# Patient Record
Sex: FEMALE | Race: BLACK OR AFRICAN AMERICAN | NOT HISPANIC OR LATINO
[De-identification: names, ages, dates, MRNs, and addresses within clinical notes are randomized per-mention and may not be internally consistent; named-entity substitution may affect disease eponyms.]

---

## 2017-01-04 ENCOUNTER — APPOINTMENT (OUTPATIENT)
Dept: ORTHOPEDIC SURGERY | Facility: CLINIC | Age: 77
End: 2017-01-04

## 2017-01-04 VITALS
HEART RATE: 53 BPM | BODY MASS INDEX: 25.03 KG/M2 | WEIGHT: 136 LBS | HEIGHT: 62 IN | DIASTOLIC BLOOD PRESSURE: 77 MMHG | SYSTOLIC BLOOD PRESSURE: 183 MMHG

## 2017-01-04 RX ADMIN — LIDOCAINE HYDROCHLORIDE 3 %: 10 INJECTION, SOLUTION EPIDURAL; INFILTRATION; INTRACAUDAL; PERINEURAL at 00:00

## 2017-01-04 RX ADMIN — Medication 2 MG/2ML: at 00:00

## 2017-01-09 RX ORDER — HYALURONATE SODIUM 20 MG/2 ML
20 SYRINGE (ML) INTRAARTICULAR
Qty: 0 | Refills: 0 | Status: COMPLETED | OUTPATIENT
Start: 2017-01-04

## 2017-01-09 RX ORDER — HYALURONATE SODIUM 20 MG/2 ML
20 SYRINGE (ML) INTRAARTICULAR
Refills: 0 | Status: COMPLETED | OUTPATIENT
Start: 2017-01-04

## 2017-01-09 RX ORDER — LIDOCAINE HYDROCHLORIDE 10 MG/ML
1 INJECTION, SOLUTION INFILTRATION; PERINEURAL
Refills: 0 | Status: COMPLETED | OUTPATIENT
Start: 2017-01-04

## 2017-01-11 ENCOUNTER — APPOINTMENT (OUTPATIENT)
Dept: ORTHOPEDIC SURGERY | Facility: CLINIC | Age: 77
End: 2017-01-11

## 2017-01-11 VITALS
WEIGHT: 136 LBS | SYSTOLIC BLOOD PRESSURE: 151 MMHG | HEART RATE: 67 BPM | HEIGHT: 62 IN | BODY MASS INDEX: 25.03 KG/M2 | DIASTOLIC BLOOD PRESSURE: 81 MMHG

## 2019-08-14 ENCOUNTER — APPOINTMENT (OUTPATIENT)
Dept: ORTHOPEDIC SURGERY | Facility: CLINIC | Age: 79
End: 2019-08-14
Payer: MEDICARE

## 2019-08-14 VITALS
BODY MASS INDEX: 25.03 KG/M2 | HEIGHT: 62 IN | HEART RATE: 64 BPM | DIASTOLIC BLOOD PRESSURE: 81 MMHG | WEIGHT: 136 LBS | SYSTOLIC BLOOD PRESSURE: 135 MMHG

## 2019-08-14 DIAGNOSIS — Z86.73 PERSONAL HISTORY OF TRANSIENT ISCHEMIC ATTACK (TIA), AND CEREBRAL INFARCTION W/OUT RESIDUAL DEFICITS: ICD-10-CM

## 2019-08-14 DIAGNOSIS — Z87.39 PERSONAL HISTORY OF OTHER DISEASES OF THE MUSCULOSKELETAL SYSTEM AND CONNECTIVE TISSUE: ICD-10-CM

## 2019-08-14 PROCEDURE — 73562 X-RAY EXAM OF KNEE 3: CPT | Mod: 50

## 2019-08-14 PROCEDURE — 99214 OFFICE O/P EST MOD 30 MIN: CPT

## 2019-08-15 PROBLEM — Z87.39 HISTORY OF OSTEOPOROSIS: Status: RESOLVED | Noted: 2019-08-14 | Resolved: 2019-08-15

## 2019-08-15 PROBLEM — Z86.73 HISTORY OF STROKE: Status: RESOLVED | Noted: 2019-08-14 | Resolved: 2019-08-15

## 2019-08-26 ENCOUNTER — CHART COPY (OUTPATIENT)
Age: 79
End: 2019-08-26

## 2019-08-26 RX ORDER — HYALURONATE SODIUM 20 MG/2 ML
20 SYRINGE (ML) INTRAARTICULAR
Qty: 2 | Refills: 0 | Status: DISCONTINUED | OUTPATIENT
Start: 2019-08-14 | End: 2019-08-26

## 2019-10-02 RX ORDER — HYLAN G-F 20 16MG/2ML
48 SYRINGE (ML) INTRAARTICULAR
Qty: 2 | Refills: 0 | Status: ACTIVE | OUTPATIENT
Start: 2019-08-26

## 2019-10-07 ENCOUNTER — APPOINTMENT (OUTPATIENT)
Dept: OBGYN | Facility: CLINIC | Age: 79
End: 2019-10-07
Payer: MEDICARE

## 2019-10-07 PROCEDURE — 99387 INIT PM E/M NEW PAT 65+ YRS: CPT

## 2019-10-28 ENCOUNTER — APPOINTMENT (OUTPATIENT)
Dept: ORTHOPEDIC SURGERY | Facility: CLINIC | Age: 79
End: 2019-10-28
Payer: MEDICARE

## 2019-10-28 VITALS — BODY MASS INDEX: 25.03 KG/M2 | WEIGHT: 136 LBS | HEIGHT: 62 IN

## 2019-10-28 DIAGNOSIS — M17.11 UNILATERAL PRIMARY OSTEOARTHRITIS, RIGHT KNEE: ICD-10-CM

## 2019-10-28 PROCEDURE — 20610 DRAIN/INJ JOINT/BURSA W/O US: CPT | Mod: LT

## 2019-11-07 RX ORDER — HYLAN G-F 20 16MG/2ML
48 SYRINGE (ML) INTRAARTICULAR
Refills: 0 | Status: COMPLETED | OUTPATIENT
Start: 2019-11-07

## 2019-11-22 ENCOUNTER — APPOINTMENT (OUTPATIENT)
Dept: ORTHOPEDIC SURGERY | Facility: CLINIC | Age: 79
End: 2019-11-22
Payer: MEDICARE

## 2019-11-22 VITALS
SYSTOLIC BLOOD PRESSURE: 177 MMHG | HEART RATE: 59 BPM | WEIGHT: 136 LBS | DIASTOLIC BLOOD PRESSURE: 82 MMHG | HEIGHT: 62 IN | BODY MASS INDEX: 25.03 KG/M2

## 2019-11-22 PROCEDURE — 73030 X-RAY EXAM OF SHOULDER: CPT | Mod: RT

## 2019-11-22 PROCEDURE — 99214 OFFICE O/P EST MOD 30 MIN: CPT | Mod: 25

## 2019-11-22 PROCEDURE — 20610 DRAIN/INJ JOINT/BURSA W/O US: CPT | Mod: LT

## 2019-12-20 ENCOUNTER — APPOINTMENT (OUTPATIENT)
Dept: ORTHOPEDIC SURGERY | Facility: CLINIC | Age: 79
End: 2019-12-20
Payer: MEDICARE

## 2019-12-20 PROCEDURE — 99213 OFFICE O/P EST LOW 20 MIN: CPT

## 2020-05-27 ENCOUNTER — APPOINTMENT (OUTPATIENT)
Dept: ORTHOPEDIC SURGERY | Facility: CLINIC | Age: 80
End: 2020-05-27
Payer: MEDICARE

## 2020-05-27 VITALS — BODY MASS INDEX: 25.03 KG/M2 | WEIGHT: 136 LBS | TEMPERATURE: 98.1 F | HEIGHT: 62 IN

## 2020-05-27 PROCEDURE — 73562 X-RAY EXAM OF KNEE 3: CPT | Mod: LT

## 2020-05-27 PROCEDURE — 99214 OFFICE O/P EST MOD 30 MIN: CPT

## 2020-07-08 ENCOUNTER — APPOINTMENT (OUTPATIENT)
Dept: ORTHOPEDIC SURGERY | Facility: CLINIC | Age: 80
End: 2020-07-08
Payer: MEDICARE

## 2020-07-08 VITALS — HEIGHT: 62 IN | TEMPERATURE: 98.3 F | BODY MASS INDEX: 25.03 KG/M2 | WEIGHT: 136 LBS

## 2020-07-08 PROCEDURE — 99213 OFFICE O/P EST LOW 20 MIN: CPT

## 2020-07-14 ENCOUNTER — APPOINTMENT (OUTPATIENT)
Dept: ORTHOPEDIC SURGERY | Facility: CLINIC | Age: 80
End: 2020-07-14

## 2020-07-17 ENCOUNTER — APPOINTMENT (OUTPATIENT)
Dept: ORTHOPEDIC SURGERY | Facility: CLINIC | Age: 80
End: 2020-07-17
Payer: MEDICARE

## 2020-07-17 PROCEDURE — 20610 DRAIN/INJ JOINT/BURSA W/O US: CPT | Mod: LT

## 2020-07-17 PROCEDURE — 99213 OFFICE O/P EST LOW 20 MIN: CPT | Mod: 25

## 2020-07-17 RX ORDER — LIDOCAINE HYDROCHLORIDE 10 MG/ML
1 INJECTION, SOLUTION INFILTRATION; PERINEURAL
Refills: 0 | Status: COMPLETED | OUTPATIENT
Start: 2020-07-17

## 2020-07-17 RX ORDER — METHYLPRED ACET/NACL,ISO-OS/PF 40 MG/ML
40 VIAL (ML) INJECTION
Qty: 1 | Refills: 0 | Status: COMPLETED | OUTPATIENT
Start: 2020-07-17

## 2020-07-17 RX ADMIN — LIDOCAINE HYDROCHLORIDE 4 %: 10 INJECTION, SOLUTION INFILTRATION; PERINEURAL at 00:00

## 2020-07-17 RX ADMIN — METHYLPREDNISOLONE ACETATE 1 MG/ML: 40 INJECTION, SUSPENSION INTRALESIONAL; INTRAMUSCULAR; INTRASYNOVIAL; SOFT TISSUE at 00:00

## 2020-07-17 NOTE — PHYSICAL EXAM
[de-identified] : - Constitutional: This is a female in no obvious distress.  She was accompanied by her daughter today.\par - Psych: Patient is alert and oriented to person, place and time.  Patient has a normal mood and affect.\par - Cardiovascular: Normal pulses throughout the upper extremities.  No significant varicosities are noted in the upper extremities. \par - Neuro: Strength and sensation are intact throughout the upper extremities.  Patient has normal coordination.\par - Respiratory:  Patient exhibits no evidence of shortness of breath or difficulty breathing.\par - Skin: No rashes, lesions, or other abnormalities are noted in the upper extremities.\par \par ---\par \par Examination of both shoulders demonstrates no focal swelling.  There is no localized swelling or tenderness along the AC joint on either side.  On the left side she has a positive Neer and James sign.  There is limitation of terminal motion.  There is mild weakness secondary to pain.  There is a negative drop arm test.  Examination of her right shoulder demonstrates minimal pain with motion.  She has a negative Neer and James sign.  There are no focal deficits in rotator cuff strength.  She remains neurovascularly intact distally. [de-identified] : Previous AP and Y radiographs of her left shoulder demonstrated mild AC joint arthritis.  There is no evidence of a significant subacromial spur or glenohumeral joint arthritis.\par \par Previous AP and Y radiographs of her right shoulder demonstrated mild AC joint arthritis and glenohumeral joint arthritis.  There is no evidence of a large subacromial spur.

## 2020-07-17 NOTE — HISTORY OF PRESENT ILLNESS
[Right] : right hand dominant [FreeTextEntry1] : She comes in today for evaluation of left greater than right shoulder pain for the past month.  She denies an injury.  She is more symptomatic at night.\par \par She was referred by Dr. Lopez.\par \par She was accompanied by her daughter today.

## 2020-07-17 NOTE — ADDENDUM
[FreeTextEntry1] : I, Jericho Blanc, acted solely as a scribe for Dr. Corky Sandoval on 12/20/2019 . \par \par All medical record entries made by the Scribe were at my, Dr. Corky Sandoval, direction and personally dictated by me on 12/20/2019. I have personally reviewed the chart and agree that the record accurately reflects my personal performance of the history, physical exam, assessment and plan.

## 2020-07-17 NOTE — END OF VISIT
[FreeTextEntry3] : This note was written by Aleah Nieves on 07/17/2020 acting solely as a scribe for Dr. Corky Sandoval.\par  \par All medical record entries made by the Scribe were at my, Dr. Corky Sandoval, direction and personally dictated by me on 07/17/2020. I have personally reviewed the chart and agree that the record accurately reflects my personal performance of the history, physical exam, assessment and plan.

## 2020-07-17 NOTE — ADDENDUM
[FreeTextEntry1] : I, Aleah Nieves, acted solely as a scribe for Dr. Sandoval on this date 07/17/2020.

## 2020-07-17 NOTE — PROCEDURE
[FreeTextEntry1] : -  After a discussion of risks and benefits, the patient agreed to proceed with a cortisone injection.  \par -  Side injected: Left subacromial space.\par -  Medications injected: 4 cc of 1% Lidocaine and 1 cc of 40mg of Depomedrol, using sterile technique.\par -  Patient tolerated the procedure well, without complications.\par -  Patient noted immediate improvement of the symptoms, secondary to the anesthetic effects of the injection.\par -  Patient was told that the pain may worsen for a day or two, and should then begin to improve.  \par -  Instructions: Patient was instructed on the use of ice, anti-inflammatory agents or Tylenol, and activity modification. \par -  Follow-up: 6 to 8 weeks.

## 2020-07-17 NOTE — HISTORY OF PRESENT ILLNESS
[FreeTextEntry1] : Follow-up regarding left greater than right shoulder pain secondary to impingement syndrome.  She was seen in the office 4 weeks ago and given a cortisone injection in the more symptomatic left side.\par \par She states that her right shoulder feels much better than her left today.  She believes that it is approximately 60% better.\par \par She was accompanied by her daughter today.

## 2020-07-17 NOTE — PHYSICAL EXAM
[de-identified] : - Constitutional: This is a female in no obvious distress.  She was accompanied by her daughter today.\par - Psych: Patient is alert and oriented to person, place and time.  Patient has a normal mood and affect.\par - Cardiovascular: Normal pulses throughout the upper extremities.  No significant varicosities are noted in the upper extremities. \par - Neuro: Strength and sensation are intact throughout the upper extremities.  Patient has normal coordination.\par - Respiratory:  Patient exhibits no evidence of shortness of breath or difficulty breathing.\par - Skin: No rashes, lesions, or other abnormalities are noted in the upper extremities.\par \par ---\par \par Examination of both shoulders demonstrates no focal swelling.  There is no localized swelling or tenderness along the AC joint on either side.  She has decreased pain with forward elevation on the left side and has improved motion.  She has a minimally positive Neer sign.  There there are no focal deficits in rotator cuff strength.  Examination of her right shoulder demonstrates full motion with minimal pain with forward elevation.  She remains neurovascularly intact distally. [de-identified] : Previous AP and Y radiographs of her left shoulder demonstrated mild AC joint arthritis.  There is no evidence of a significant subacromial spur or glenohumeral joint arthritis.\par \par Previous AP and Y radiographs of her right shoulder demonstrated mild AC joint arthritis and glenohumeral joint arthritis.  There is no evidence of a large subacromial spur.

## 2020-07-17 NOTE — HISTORY OF PRESENT ILLNESS
[Right] : right hand dominant [FreeTextEntry1] : Follow-up regarding left greater than right shoulder pain secondary to impingement syndrome.  She was given a cortisone injection at the more symptomatic left side approximately 8 months ago.  She was then referred to physical therapy.\par \par She returns today with complaints of recurrent pain in the left shoulder. The injection provided her relief of her symptoms, though her pain has since returned. She reports only mild intermittent pain in the right shoulder. She had attended physical therapy for 6 weeks, which helped to alleviate her symptoms. Currently she describes pain along the lateral aspect of her left shoulder that is exacerbated with elevation and reaching behind her back. She denies any numbness/tingling or radicular symptoms. She rates her left shoulder pain a 5 out of 10 at this time. \par \par She ambulates with the assistance of a cane. \par \par She was accompanied by her daughter today.

## 2020-07-17 NOTE — PROCEDURE
[FreeTextEntry1] : -  After a discussion of risks and benefits, the patient agreed to proceed with a cortisone injection.  \par -  Side injected: Left subacromial space.\par -  Medications injected: 4 cc of 1% Lidocaine and 1 cc of 40mg of Depomedrol, using sterile technique.\par -  Patient tolerated the procedure well, without complications.\par -  Patient noted immediate improvement of the symptoms, secondary to the anesthetic effects of the injection.\par -  Patient was told that the pain may worsen for a day or two, and should then begin to improve.  \par -  Instructions: Patient was instructed on the use of ice, anti-inflammatory agents or Tylenol, and activity modification. \par -  Follow-up: Within 4 weeks to assess response to the injection.

## 2020-07-17 NOTE — DISCUSSION/SUMMARY
[FreeTextEntry1] : I had a discussion regarding today's visit, the diagnosis and treatment recommendations / options.  At this time, she opted for a repeat cortisone injection at the left shoulder subacromial space.  She understands that this may not provide her with long-term relief, and if it does not, but I would not recommend repeat injections.  She was also given an updated prescription for physical therapy.\par \par The patient has agreed to the above plan of management and has expressed full understanding.  All questions were fully answered to the patient's satisfaction.\par \par I spent at least 25 minutes of face-to-face time with the patient.  Over 50% of this time was spent on counseling the patient on the above diagnosis, treatment plan and prognosis.

## 2020-07-17 NOTE — DISCUSSION/SUMMARY
[FreeTextEntry1] : I had a discussion regarding today's visit, the diagnosis and treatment recommendations / options.  At this time, I recommended a course of physical therapy.  She will follow-up within 6 weeks if needed, according to her symptoms.\par \par The patient has agreed to the above plan of management and has expressed full understanding.  All questions were fully answered to the patient's satisfaction.\par \par I spent at least 25 minutes of face-to-face time with the patient.  Over 50% of this time was spent on counseling the patient on the above diagnosis, treatment plan and prognosis.

## 2020-07-17 NOTE — PHYSICAL EXAM
[de-identified] : - Constitutional: This is a female in no obvious distress.  She was accompanied by her daughter today.\par - Psych: Patient is alert and oriented to person, place and time.  Patient has a normal mood and affect.\par - Cardiovascular: Normal pulses throughout the upper extremities.  No significant varicosities are noted in the upper extremities. \par - Neuro: Strength and sensation are intact throughout the upper extremities.  Patient has normal coordination.\par - Respiratory:  Patient exhibits no evidence of shortness of breath or difficulty breathing.\par - Skin: No rashes, lesions, or other abnormalities are noted in the upper extremities.\par \par ---\par \par Examination of both shoulders demonstrates no focal swelling.  There is no localized swelling or tenderness along the AC joint on either side.  She has a positive Neer and Jamse sign bilaterally.  This is more notable on the left side.  There are no focal deficits in rotator cuff strength.  There is mild limitation of terminal forward elevation.  She is neurovascularly intact distally. [de-identified] : AP and Y radiographs of her left shoulder demonstrate mild AC joint arthritis.  There is no evidence of a significant subacromial spur or glenohumeral joint arthritis.\par \par AP and Y radiographs of her right shoulder demonstrate mild AC joint arthritis and glenohumeral joint arthritis.  There is no evidence of a large subacromial spur.

## 2020-07-17 NOTE — DISCUSSION/SUMMARY
[FreeTextEntry1] : She has findings consistent with left greater than right shoulder pain secondary to impingement syndrome and probable rotator cuff degeneration.\par \par I had a discussion regarding today's visit, the diagnosis, and treatment recommendations / options.  We discussed either a cortisone injection or a course of physical therapy.  At this time she agreed to initially try a cortisone injection on the more symptomatic left side.\par \par The patient has agreed to this plan of management and has expressed full understanding.  All questions were fully answered to the patient's satisfaction.\par \par Over 50% of the time spent with the patient was on counseling the patient on the above diagnosis, treatment plan and prognosis.

## 2020-07-21 ENCOUNTER — RESULT REVIEW (OUTPATIENT)
Age: 80
End: 2020-07-21

## 2020-07-21 ENCOUNTER — APPOINTMENT (OUTPATIENT)
Dept: MRI IMAGING | Facility: CLINIC | Age: 80
End: 2020-07-21
Payer: MEDICARE

## 2020-07-21 ENCOUNTER — OUTPATIENT (OUTPATIENT)
Dept: OUTPATIENT SERVICES | Facility: HOSPITAL | Age: 80
LOS: 1 days | End: 2020-07-21
Payer: MEDICARE

## 2020-07-21 DIAGNOSIS — M17.12 UNILATERAL PRIMARY OSTEOARTHRITIS, LEFT KNEE: ICD-10-CM

## 2020-07-21 PROCEDURE — 73721 MRI JNT OF LWR EXTRE W/O DYE: CPT | Mod: 26,LT

## 2020-07-21 PROCEDURE — 73721 MRI JNT OF LWR EXTRE W/O DYE: CPT

## 2020-08-03 ENCOUNTER — APPOINTMENT (OUTPATIENT)
Dept: ORTHOPEDIC SURGERY | Facility: CLINIC | Age: 80
End: 2020-08-03
Payer: MEDICARE

## 2020-08-03 VITALS — HEIGHT: 62 IN | WEIGHT: 136 LBS | BODY MASS INDEX: 25.03 KG/M2 | TEMPERATURE: 98.2 F

## 2020-08-03 DIAGNOSIS — S83.207A UNSPECIFIED TEAR OF UNSPECIFIED MENISCUS, CURRENT INJURY, LEFT KNEE, INITIAL ENCOUNTER: ICD-10-CM

## 2020-08-03 PROCEDURE — 99213 OFFICE O/P EST LOW 20 MIN: CPT

## 2020-08-06 VITALS
WEIGHT: 129.63 LBS | DIASTOLIC BLOOD PRESSURE: 72 MMHG | HEART RATE: 68 BPM | HEIGHT: 62 IN | RESPIRATION RATE: 14 BRPM | SYSTOLIC BLOOD PRESSURE: 188 MMHG | TEMPERATURE: 97 F | OXYGEN SATURATION: 99 %

## 2020-08-06 RX ORDER — LOSARTAN POTASSIUM 100 MG/1
1 TABLET, FILM COATED ORAL
Qty: 0 | Refills: 0 | DISCHARGE

## 2020-08-06 NOTE — H&P PST ADULT - ASSESSMENT
80 y/o 80 y/o female with left knee meniscus tear 78 y/o female with left knee meniscus tear   COVID testing negative    BP ; 188 /72 on admission patient took Losartan 100mg last night . will discuss with anesthesiologist

## 2020-08-06 NOTE — H&P PST ADULT - NSICDXPASTSURGICALHX_GEN_ALL_CORE_FT
PAST SURGICAL HISTORY:  Benign cyst of breast Excision of bilatral breast cyst 1970's    History of cataract surgery bilateral 2018    History of lithotripsy [facility] June,2020    S/P ORIF (open reduction internal fixation) fracture right wrist 1993 PAST SURGICAL HISTORY:  Benign cyst of breast Excision of bilateral breast cyst 1970's    History of cataract surgery bilateral 2018    History of lithotripsy [facility]SW[facility] June,2020    S/P ORIF (open reduction internal fixation) fracture right wrist 1993

## 2020-08-06 NOTE — H&P PST ADULT - SOURCE OF INFORMATION, PROFILE
Medical history obtained fro daughter Jeri via telephone . physical exam to be done on DOS/patient patient/family/Medical history obtained from daughter Jeri via telephone . physical exam to be done on DOS

## 2020-08-06 NOTE — H&P PST ADULT - HISTORY OF PRESENT ILLNESS
This is a 80 y/o female with complaint of left knee pain and buckling for the past several months. Tried PT for 8 weeks with no improvement . MRI revealed lateral meniscus tear . scheduled for left knee arthroscopy on 8/13/20 This is a 78 y/o female with complaint of left knee pain and buckling for the past several months. Denies trauma or injury  Tried PT for 8 weeks with no improvement . MRI revealed lateral meniscus tear . scheduled for left knee arthroscopy on 8/13/20

## 2020-08-06 NOTE — H&P PST ADULT - NSANTHOSAYNRD_GEN_A_CORE
No. HUGO screening performed.  STOP BANG Legend: 0-2 = LOW Risk; 3-4 = INTERMEDIATE Risk; 5-8 = HIGH Risk

## 2020-08-06 NOTE — H&P PST ADULT - NSICDXPROBLEM_GEN_ALL_CORE_FT
PROBLEM DIAGNOSES  Problem: Pain in left knee  Assessment and Plan: left klnee arthroscopy   Medical clearance   Pre op instructions   COVID testing on 8/11/20 PROBLEM DIAGNOSES  Problem: Pain in left knee  Assessment and Plan: left knee arthroscopy   Medical clearance   Pre op instructions   COVID testing on 8/11/20

## 2020-08-06 NOTE — H&P PST ADULT - NSICDXPASTMEDICALHX_GEN_ALL_CORE_FT
PAST MEDICAL HISTORY:  Cerebrovascular accident (CVA) 2016 no residual    HTN (hypertension) PAST MEDICAL HISTORY:  Cerebrovascular accident (CVA) 2016 no residual    HTN (hypertension)     Kidney stones s/p lithotripsy

## 2020-08-11 ENCOUNTER — OUTPATIENT (OUTPATIENT)
Dept: OUTPATIENT SERVICES | Facility: HOSPITAL | Age: 80
LOS: 1 days | End: 2020-08-11
Payer: MEDICARE

## 2020-08-11 DIAGNOSIS — Z01.818 ENCOUNTER FOR OTHER PREPROCEDURAL EXAMINATION: ICD-10-CM

## 2020-08-11 DIAGNOSIS — Z98.890 OTHER SPECIFIED POSTPROCEDURAL STATES: Chronic | ICD-10-CM

## 2020-08-11 DIAGNOSIS — N60.09 SOLITARY CYST OF UNSPECIFIED BREAST: Chronic | ICD-10-CM

## 2020-08-11 DIAGNOSIS — S83.207A UNSPECIFIED TEAR OF UNSPECIFIED MENISCUS, CURRENT INJURY, LEFT KNEE, INITIAL ENCOUNTER: ICD-10-CM

## 2020-08-11 DIAGNOSIS — M25.562 PAIN IN LEFT KNEE: ICD-10-CM

## 2020-08-11 DIAGNOSIS — Z98.49 CATARACT EXTRACTION STATUS, UNSPECIFIED EYE: Chronic | ICD-10-CM

## 2020-08-11 PROCEDURE — U0003: CPT

## 2020-08-11 PROCEDURE — G0463: CPT

## 2020-08-12 ENCOUNTER — TRANSCRIPTION ENCOUNTER (OUTPATIENT)
Age: 80
End: 2020-08-12

## 2020-08-12 DIAGNOSIS — Z11.59 ENCOUNTER FOR SCREENING FOR OTHER VIRAL DISEASES: ICD-10-CM

## 2020-08-12 LAB
ALBUMIN SERPL ELPH-MCNC: 4.3 G/DL
ALP BLD-CCNC: 91 U/L
ALT SERPL-CCNC: 17 U/L
ANION GAP SERPL CALC-SCNC: 12 MMOL/L
APTT BLD: 29.8 SEC
AST SERPL-CCNC: 25 U/L
BASOPHILS # BLD AUTO: 0.02 K/UL
BASOPHILS NFR BLD AUTO: 0.5 %
BILIRUB SERPL-MCNC: 0.4 MG/DL
BUN SERPL-MCNC: 15 MG/DL
CALCIUM SERPL-MCNC: 10 MG/DL
CHLORIDE SERPL-SCNC: 108 MMOL/L
CO2 SERPL-SCNC: 25 MMOL/L
CREAT SERPL-MCNC: 0.96 MG/DL
EOSINOPHIL # BLD AUTO: 0.06 K/UL
EOSINOPHIL NFR BLD AUTO: 1.5 %
GLUCOSE SERPL-MCNC: 93 MG/DL
HCT VFR BLD CALC: 44.7 %
HGB BLD-MCNC: 13.6 G/DL
IMM GRANULOCYTES NFR BLD AUTO: 0.2 %
INR PPP: 0.92 RATIO
LYMPHOCYTES # BLD AUTO: 1.41 K/UL
LYMPHOCYTES NFR BLD AUTO: 34.3 %
MAN DIFF?: NORMAL
MCHC RBC-ENTMCNC: 26.6 PG
MCHC RBC-ENTMCNC: 30.4 GM/DL
MCV RBC AUTO: 87.3 FL
MONOCYTES # BLD AUTO: 0.51 K/UL
MONOCYTES NFR BLD AUTO: 12.4 %
NEUTROPHILS # BLD AUTO: 2.1 K/UL
NEUTROPHILS NFR BLD AUTO: 51.1 %
PLATELET # BLD AUTO: 173 K/UL
POTASSIUM SERPL-SCNC: 5 MMOL/L
PROT SERPL-MCNC: 7 G/DL
PT BLD: 11 SEC
RBC # BLD: 5.12 M/UL
RBC # FLD: 13 %
SARS-COV-2 RNA SPEC QL NAA+PROBE: SIGNIFICANT CHANGE UP
SODIUM SERPL-SCNC: 145 MMOL/L
WBC # FLD AUTO: 4.11 K/UL

## 2020-08-13 ENCOUNTER — RESULT REVIEW (OUTPATIENT)
Age: 80
End: 2020-08-13

## 2020-08-13 ENCOUNTER — OUTPATIENT (OUTPATIENT)
Dept: OUTPATIENT SERVICES | Facility: HOSPITAL | Age: 80
LOS: 1 days | Discharge: HOME | End: 2020-08-13
Payer: MEDICARE

## 2020-08-13 ENCOUNTER — APPOINTMENT (OUTPATIENT)
Dept: ORTHOPEDIC SURGERY | Facility: HOSPITAL | Age: 80
End: 2020-08-13

## 2020-08-13 VITALS
RESPIRATION RATE: 18 BRPM | SYSTOLIC BLOOD PRESSURE: 152 MMHG | DIASTOLIC BLOOD PRESSURE: 76 MMHG | TEMPERATURE: 98 F | HEART RATE: 68 BPM | OXYGEN SATURATION: 100 %

## 2020-08-13 VITALS
HEART RATE: 68 BPM | OXYGEN SATURATION: 99 % | WEIGHT: 129.63 LBS | RESPIRATION RATE: 16 BRPM | HEIGHT: 62 IN | SYSTOLIC BLOOD PRESSURE: 196 MMHG | TEMPERATURE: 98 F | DIASTOLIC BLOOD PRESSURE: 76 MMHG

## 2020-08-13 DIAGNOSIS — Z98.890 OTHER SPECIFIED POSTPROCEDURAL STATES: Chronic | ICD-10-CM

## 2020-08-13 DIAGNOSIS — Z98.49 CATARACT EXTRACTION STATUS, UNSPECIFIED EYE: Chronic | ICD-10-CM

## 2020-08-13 DIAGNOSIS — N60.09 SOLITARY CYST OF UNSPECIFIED BREAST: Chronic | ICD-10-CM

## 2020-08-13 DIAGNOSIS — S83.207A UNSPECIFIED TEAR OF UNSPECIFIED MENISCUS, CURRENT INJURY, LEFT KNEE, INITIAL ENCOUNTER: ICD-10-CM

## 2020-08-13 PROCEDURE — 29881 ARTHRS KNE SRG MNISECTMY M/L: CPT | Mod: LT

## 2020-08-13 PROCEDURE — 29881 ARTHRS KNE SRG MNISECTMY M/L: CPT | Mod: AS,LT

## 2020-08-13 PROCEDURE — 88304 TISSUE EXAM BY PATHOLOGIST: CPT

## 2020-08-13 PROCEDURE — 97161 PT EVAL LOW COMPLEX 20 MIN: CPT

## 2020-08-13 PROCEDURE — 88304 TISSUE EXAM BY PATHOLOGIST: CPT | Mod: 26

## 2020-08-13 RX ORDER — CELECOXIB 200 MG/1
1 CAPSULE ORAL
Qty: 14 | Refills: 0
Start: 2020-08-13 | End: 2020-08-26

## 2020-08-13 RX ORDER — SODIUM CHLORIDE 9 MG/ML
1000 INJECTION, SOLUTION INTRAVENOUS
Refills: 0 | Status: DISCONTINUED | OUTPATIENT
Start: 2020-08-13 | End: 2020-08-13

## 2020-08-13 RX ORDER — OXYCODONE AND ACETAMINOPHEN 5; 325 MG/1; MG/1
1 TABLET ORAL ONCE
Refills: 0 | Status: DISCONTINUED | OUTPATIENT
Start: 2020-08-13 | End: 2020-08-13

## 2020-08-13 RX ORDER — CEFAZOLIN SODIUM 1 G
2000 VIAL (EA) INJECTION ONCE
Refills: 0 | Status: COMPLETED | OUTPATIENT
Start: 2020-08-13 | End: 2020-08-13

## 2020-08-13 RX ORDER — ACETAMINOPHEN WITH CODEINE 300MG-30MG
2 TABLET ORAL
Qty: 20 | Refills: 0
Start: 2020-08-13

## 2020-08-13 RX ORDER — HYDROMORPHONE HYDROCHLORIDE 2 MG/ML
0.5 INJECTION INTRAMUSCULAR; INTRAVENOUS; SUBCUTANEOUS
Refills: 0 | Status: DISCONTINUED | OUTPATIENT
Start: 2020-08-13 | End: 2020-08-13

## 2020-08-13 RX ORDER — CHLORHEXIDINE GLUCONATE 213 G/1000ML
1 SOLUTION TOPICAL ONCE
Refills: 0 | Status: COMPLETED | OUTPATIENT
Start: 2020-08-13 | End: 2020-08-13

## 2020-08-13 RX ORDER — ONDANSETRON 8 MG/1
4 TABLET, FILM COATED ORAL ONCE
Refills: 0 | Status: DISCONTINUED | OUTPATIENT
Start: 2020-08-13 | End: 2020-08-13

## 2020-08-13 RX ADMIN — SODIUM CHLORIDE 75 MILLILITER(S): 9 INJECTION, SOLUTION INTRAVENOUS at 12:41

## 2020-08-13 RX ADMIN — CHLORHEXIDINE GLUCONATE 1 APPLICATION(S): 213 SOLUTION TOPICAL at 09:04

## 2020-08-13 NOTE — ASU DISCHARGE PLAN (ADULT/PEDIATRIC) - CALL YOUR DOCTOR IF YOU HAVE ANY OF THE FOLLOWING:
Wound/Surgical Site with redness, or foul smelling discharge or pus Fever greater than (need to indicate Fahrenheit or Celsius)/Inability to tolerate liquids or foods/Increased irritability or sluggishness/Wound/Surgical Site with redness, or foul smelling discharge or pus/Swelling that gets worse/Bleeding that does not stop/Numbness, tingling, color or temperature change to extremity/Nausea and vomiting that does not stop/Pain not relieved by Medications

## 2020-08-13 NOTE — BRIEF OPERATIVE NOTE - NSICDXBRIEFPOSTOP_GEN_ALL_CORE_FT
POST-OP DIAGNOSIS:  Chondromalacia, knee, left 13-Aug-2020 12:36:34  Anika Griffiths  Derangement of lateral meniscus of left knee 13-Aug-2020 12:36:15  Anika Griffiths

## 2020-08-13 NOTE — ASU DISCHARGE PLAN (ADULT/PEDIATRIC) - CARE PROVIDER_API CALL
Roberto Lopez  ORTHOPAEDIC SURGERY  [address] [place] [address]  Phone: [phone]  Fax: [phone]  Follow Up Time:

## 2020-08-14 PROBLEM — I10 ESSENTIAL (PRIMARY) HYPERTENSION: Chronic | Status: ACTIVE | Noted: 2020-08-06

## 2020-08-14 PROBLEM — N20.0 CALCULUS OF KIDNEY: Chronic | Status: ACTIVE | Noted: 2020-08-06

## 2020-08-19 ENCOUNTER — APPOINTMENT (OUTPATIENT)
Dept: ORTHOPEDIC SURGERY | Facility: CLINIC | Age: 80
End: 2020-08-19
Payer: MEDICARE

## 2020-08-19 VITALS
DIASTOLIC BLOOD PRESSURE: 83 MMHG | TEMPERATURE: 98 F | BODY MASS INDEX: 25.03 KG/M2 | WEIGHT: 136 LBS | HEART RATE: 63 BPM | SYSTOLIC BLOOD PRESSURE: 156 MMHG | HEIGHT: 62 IN

## 2020-08-19 PROCEDURE — 99024 POSTOP FOLLOW-UP VISIT: CPT

## 2020-08-19 PROCEDURE — 73562 X-RAY EXAM OF KNEE 3: CPT | Mod: LT

## 2020-09-25 ENCOUNTER — APPOINTMENT (OUTPATIENT)
Dept: ORTHOPEDIC SURGERY | Facility: CLINIC | Age: 80
End: 2020-09-25
Payer: MEDICARE

## 2020-09-25 VITALS — TEMPERATURE: 97.7 F

## 2020-09-25 PROCEDURE — 99214 OFFICE O/P EST MOD 30 MIN: CPT

## 2020-09-25 NOTE — END OF VISIT
[FreeTextEntry3] : This note was written by Aleah Nieves on 09/25/2020 acting solely as a scribe for Dr. Corky Sandoval.\par  \par All medical record entries made by the Scribe were at my, Dr. Corky Sandoval, direction and personally dictated by me on 09/25/2020. I have personally reviewed the chart and agree that the record accurately reflects my personal performance of the history, physical exam, assessment and plan.

## 2020-09-25 NOTE — ADDENDUM
[FreeTextEntry1] : I, Aleah Nieves, acted solely as a scribe for Dr. Sandoval on this date 09/25/2020.

## 2020-09-25 NOTE — HISTORY OF PRESENT ILLNESS
[Right] : right hand dominant [FreeTextEntry1] : Greater than 2 months status post left shoulder subacromial cortisone injection #2 for impingement syndrome.  She also has symptoms on the right side which was not injected.  She is in physical therapy.\par \par She returns today due to recurrent symptoms. The prior injection had alleviate her symptoms for approximately 5 weeks. Currently she reports intermittent pain in her left shoulder with elevation and with reaching behind her back. She denies any pain at rest. She rates her pain a 3 out of 10 positionally.  She is doing well with regard to her right side. \par \par She ambulates with the assistance of a cane. \par \par She was accompanied by her daughter today.

## 2020-09-25 NOTE — PHYSICAL EXAM
[de-identified] : - Constitutional: This is a female in no obvious distress.  She was accompanied by her daughter today.\par - Psych: Patient is alert and oriented to person, place and time.  Patient has a normal mood and affect.\par - Cardiovascular: Normal pulses throughout the upper extremities.  No significant varicosities are noted in the upper extremities. \par - Neuro: Strength and sensation are intact throughout the upper extremities.  Patient has normal coordination.\par - Respiratory:  Patient exhibits no evidence of shortness of breath or difficulty breathing.\par - Skin: No rashes, lesions, or other abnormalities are noted in the upper extremities.\par \par ---\par \par Examination of her left shoulder demonstrates no focal swelling.  There is no tenderness along the AC joint.  There is a positive Neer and James sign.  There is limitation of terminal motion in all directions, particularly forward elevation and internal rotation.  There is mild weakness secondary to pain.  There is a negative drop arm test.  She remains neurovascularly intact distally. [de-identified] : Previous AP and Y radiographs of her left shoulder demonstrated mild AC joint arthritis.  There is no evidence of a significant subacromial spur or glenohumeral joint arthritis.\par \par Previous AP and Y radiographs of her right shoulder demonstrated mild AC joint arthritis and glenohumeral joint arthritis.  There is no evidence of a large subacromial spur.

## 2020-09-25 NOTE — DISCUSSION/SUMMARY
[FreeTextEntry1] : I had a discussion regarding today's visit, the diagnosis and treatment recommendations / options.  At this time, we discussed the options of another cortisone injection today into the glenohumeral joint, versus an MRI. She told me she would like to first obtain the MRI. I ordered an MRI to rule out rotator cuff tear or adhesive capsulitis. She will follow up after her MRI to review the results and discuss treatment recommendations.  Depending upon the results, I may recommend a repeat cortisone injection, this time into the glenohumeral joint.\par \par The patient has agreed to the above plan of management and has expressed full understanding.  All questions were fully answered to the patient's satisfaction.\par \par I spent at least 25 minutes of face-to-face time with the patient.  Over 50% of this time was spent on counseling the patient on the above diagnosis, treatment plan and prognosis.

## 2020-09-30 ENCOUNTER — APPOINTMENT (OUTPATIENT)
Dept: ORTHOPEDIC SURGERY | Facility: CLINIC | Age: 80
End: 2020-09-30
Payer: MEDICARE

## 2020-09-30 VITALS — TEMPERATURE: 97.3 F | BODY MASS INDEX: 25.03 KG/M2 | HEIGHT: 62 IN | WEIGHT: 136 LBS

## 2020-09-30 DIAGNOSIS — Z98.890 OTHER SPECIFIED POSTPROCEDURAL STATES: ICD-10-CM

## 2020-09-30 PROCEDURE — 99024 POSTOP FOLLOW-UP VISIT: CPT

## 2020-10-02 ENCOUNTER — APPOINTMENT (OUTPATIENT)
Dept: MRI IMAGING | Facility: CLINIC | Age: 80
End: 2020-10-02
Payer: MEDICARE

## 2020-10-02 ENCOUNTER — OUTPATIENT (OUTPATIENT)
Dept: OUTPATIENT SERVICES | Facility: HOSPITAL | Age: 80
LOS: 1 days | End: 2020-10-02
Payer: MEDICARE

## 2020-10-02 DIAGNOSIS — N60.09 SOLITARY CYST OF UNSPECIFIED BREAST: Chronic | ICD-10-CM

## 2020-10-02 DIAGNOSIS — Z98.890 OTHER SPECIFIED POSTPROCEDURAL STATES: Chronic | ICD-10-CM

## 2020-10-02 DIAGNOSIS — Z98.49 CATARACT EXTRACTION STATUS, UNSPECIFIED EYE: Chronic | ICD-10-CM

## 2020-10-02 DIAGNOSIS — M75.42 IMPINGEMENT SYNDROME OF LEFT SHOULDER: ICD-10-CM

## 2020-10-02 PROCEDURE — 73221 MRI JOINT UPR EXTREM W/O DYE: CPT | Mod: 26,LT

## 2020-10-02 PROCEDURE — 73221 MRI JOINT UPR EXTREM W/O DYE: CPT

## 2020-10-09 ENCOUNTER — TRANSCRIPTION ENCOUNTER (OUTPATIENT)
Age: 80
End: 2020-10-09

## 2020-10-09 ENCOUNTER — APPOINTMENT (OUTPATIENT)
Dept: ORTHOPEDIC SURGERY | Facility: CLINIC | Age: 80
End: 2020-10-09
Payer: MEDICARE

## 2020-10-09 VITALS — TEMPERATURE: 97.3 F

## 2020-10-09 PROCEDURE — 99214 OFFICE O/P EST MOD 30 MIN: CPT

## 2020-10-09 NOTE — ADDENDUM
[FreeTextEntry1] : I, Aleah Nieves, acted solely as a scribe for Dr. Sandoval on this date 10/09/2020.

## 2020-10-09 NOTE — PHYSICAL EXAM
[de-identified] : - Constitutional: This is a female in no obvious distress.  She was accompanied by her daughter today.\par - Psych: Patient is alert and oriented to person, place and time.  Patient has a normal mood and affect.\par - Cardiovascular: Normal pulses throughout the upper extremities.  No significant varicosities are noted in the upper extremities. \par - Neuro: Strength and sensation are intact throughout the upper extremities.  Patient has normal coordination.\par - Respiratory:  Patient exhibits no evidence of shortness of breath or difficulty breathing.\par - Skin: No rashes, lesions, or other abnormalities are noted in the upper extremities.\par \par ---\par \par Examination of her left shoulder demonstrates no focal swelling.  There is no tenderness along the AC joint.  There is a minimally positive Neer and James sign.  There is limitation of terminal motion in all directions, particularly forward elevation and internal rotation.  There is mild weakness secondary to pain.  There is a negative drop arm test.  She remains neurovascularly intact distally. [de-identified] : I reviewed the MRI of her left shoulder.  This demonstrated moderate grade articular surface tearing at the mid fibers of the supraspinatus tendon with articular surface retraction proximally.  Superimposed focal full-thickness perforation at the insertion of the mid fibers of the supraspinatus.  Insertional tear of the infraspinatus.  Market intra-articular biceps tendinosis with longitudinal tearing at the anchor with an associated SLAP tear.\par \par Previous AP and Y radiographs of her left shoulder demonstrated mild AC joint arthritis.  There is no evidence of a significant subacromial spur or glenohumeral joint arthritis.\par \par Previous AP and Y radiographs of her right shoulder demonstrated mild AC joint arthritis and glenohumeral joint arthritis.  There is no evidence of a large subacromial spur.

## 2020-10-09 NOTE — DISCUSSION/SUMMARY
[FreeTextEntry1] : I reviewed the MRI results with her and her daughter.  I had a discussion regarding today's visit, the diagnosis and treatment recommendations / options.  At this time, as she is improving, she was instructed on continued physical therapy and range of motion exercises. A new referral was provided. She will follow-up on an as needed basis. \par \par The patient has agreed to the above plan of management and has expressed full understanding.  All questions were fully answered to the patient's satisfaction.\par \par I spent at least 25 minutes of face-to-face time with the patient.  Over 50% of this time was spent on counseling the patient on the above diagnosis, treatment plan and prognosis.

## 2020-10-09 NOTE — HISTORY OF PRESENT ILLNESS
[Right] : right hand dominant [FreeTextEntry1] : Less than 3 months status post left shoulder subacromial cortisone injection #2 for impingement syndrome.  She also has symptoms on the right side which was not injected.  She is in physical therapy.\par \par See note from when she was seen in the office 2 weeks ago.  She had recurrent pain on I ordered an MRI.  She comes in to review the results and discuss treatment recommendations.\par \par She returns today and has been improving. She reports pain in her left shoulder positionally. She rates her pain a 4 out of 10 at this time. \par \par She ambulates with the assistance of a cane. \par \par She was accompanied by her daughter today.

## 2020-10-09 NOTE — END OF VISIT
[FreeTextEntry3] : This note was written by Aleah Nieves on 10/09/2020 acting solely as a scribe for Dr. Corky Sandoval.\par  \par All medical record entries made by the Scribe were at my, Dr. Corky Sandoval, direction and personally dictated by me on 10/09/2020. I have personally reviewed the chart and agree that the record accurately reflects my personal performance of the history, physical exam, assessment and plan.

## 2020-11-16 ENCOUNTER — APPOINTMENT (OUTPATIENT)
Dept: ORTHOPEDIC SURGERY | Facility: CLINIC | Age: 80
End: 2020-11-16
Payer: MEDICARE

## 2020-11-16 VITALS — BODY MASS INDEX: 25.03 KG/M2 | TEMPERATURE: 97.2 F | WEIGHT: 136 LBS | HEIGHT: 62 IN

## 2020-11-16 DIAGNOSIS — M17.12 UNILATERAL PRIMARY OSTEOARTHRITIS, LEFT KNEE: ICD-10-CM

## 2020-11-16 DIAGNOSIS — M79.606 PAIN IN LEG, UNSPECIFIED: ICD-10-CM

## 2020-11-16 PROCEDURE — 99214 OFFICE O/P EST MOD 30 MIN: CPT

## 2020-11-16 PROCEDURE — 99072 ADDL SUPL MATRL&STAF TM PHE: CPT

## 2020-12-01 ENCOUNTER — OUTPATIENT (OUTPATIENT)
Dept: OUTPATIENT SERVICES | Facility: HOSPITAL | Age: 80
LOS: 1 days | End: 2020-12-01
Payer: MEDICARE

## 2020-12-01 VITALS
WEIGHT: 132.06 LBS | DIASTOLIC BLOOD PRESSURE: 84 MMHG | OXYGEN SATURATION: 96 % | RESPIRATION RATE: 19 BRPM | SYSTOLIC BLOOD PRESSURE: 168 MMHG | TEMPERATURE: 99 F | HEIGHT: 62 IN | HEART RATE: 59 BPM

## 2020-12-01 DIAGNOSIS — N60.09 SOLITARY CYST OF UNSPECIFIED BREAST: Chronic | ICD-10-CM

## 2020-12-01 DIAGNOSIS — Z98.890 OTHER SPECIFIED POSTPROCEDURAL STATES: Chronic | ICD-10-CM

## 2020-12-01 DIAGNOSIS — M17.12 UNILATERAL PRIMARY OSTEOARTHRITIS, LEFT KNEE: ICD-10-CM

## 2020-12-01 DIAGNOSIS — Z11.59 ENCOUNTER FOR SCREENING FOR OTHER VIRAL DISEASES: ICD-10-CM

## 2020-12-01 DIAGNOSIS — Z98.49 CATARACT EXTRACTION STATUS, UNSPECIFIED EYE: Chronic | ICD-10-CM

## 2020-12-01 DIAGNOSIS — Z01.818 ENCOUNTER FOR OTHER PREPROCEDURAL EXAMINATION: ICD-10-CM

## 2020-12-01 PROCEDURE — G0463: CPT

## 2020-12-01 RX ORDER — MUPIROCIN 20 MG/G
1 OINTMENT TOPICAL
Qty: 1 | Refills: 0
Start: 2020-12-01 | End: 2020-12-05

## 2020-12-01 RX ORDER — ASPIRIN/CALCIUM CARB/MAGNESIUM 324 MG
0 TABLET ORAL
Qty: 0 | Refills: 0 | DISCHARGE

## 2020-12-01 RX ORDER — ATORVASTATIN CALCIUM 80 MG/1
1 TABLET, FILM COATED ORAL
Qty: 0 | Refills: 0 | DISCHARGE

## 2020-12-01 NOTE — H&P PST ADULT - NSICDXPROBLEM_GEN_ALL_CORE_FT
PROBLEM DIAGNOSES  Problem: Primary osteoarthritis of left knee  Assessment and Plan: LEFT Total Knee Replacement on 12/08/2020

## 2020-12-01 NOTE — H&P PST ADULT - NEUROLOGICAL
Anesthesia Evaluation     Patient summary reviewed   no history of anesthetic complications:               Airway   Mallampati: I  TM distance: >3 FB  Neck ROM: full  Dental - normal exam     Pulmonary - normal exam   (-) COPD, asthma, sleep apnea, not a smoker    ROS comment: Subglottic stenosis  Trach following gastric bypass  Cardiovascular - normal exam    Patient on routine beta blocker and Beta blocker not taken-may be given intraoperatively    (+) hypertension, hyperlipidemia      Neuro/Psych  (-) seizures, TIA, CVA  GI/Hepatic/Renal/Endo    (+)  GERD,     Musculoskeletal     Abdominal    Substance History      OB/GYN          Other                        Anesthesia Plan    ASA 2     general   total IV anesthesia  intravenous induction   Anesthetic plan and risks discussed with patient.       negative Alert & oriented; no sensory, motor or coordination deficits, normal reflexes

## 2020-12-01 NOTE — H&P PST ADULT - ASSESSMENT
81yo female patient scheduled for surgery on 12/08/2020. She will obtain Medical, Cardiac and Neuro Clearances. She will be NPO as per Anesthesia and will take Aricept on AM of surgery with a sip of water. She will drink Gatorade as directed on AM of sx. She will hold CoQ10, Gingko and Vitamin E starting today. She will continue Aspirin 81mg due to past hx of CVA. All pre-op instructions reviewed with patient. As per Covid19 Protocol, she will be screened for Covid on 12/06/2020, her history was obtained via telephone interview and PE will be done on admission. 81yo female patient scheduled for surgery on 12/08/2020. She will obtain Medical, Cardiac and Neuro Clearances. She will be NPO as per Anesthesia and will take Aricept on AM of surgery with a sip of water. She will drink Gatorade as directed on AM of sx. She will hold CoQ10, Gingko and Vitamin E starting today. She will continue Aspirin 81mg due to past hx of CVA. She denies any metal allergies. All pre-op instructions reviewed with patient. As per Covid19 Protocol, she will be screened for Covid on 12/06/2020, her history was obtained via telephone interview and PE will be done on admission.

## 2020-12-01 NOTE — H&P PST ADULT - HISTORY OF PRESENT ILLNESS
81yo female patient with over a year history of progressively worsening left knee pain. She had arthroscopy in August, but pain is not improving. She has had Cortisone and gel injections, and PT without significant improvement. She rates the pain at 5-6/10. She is taking Aleve prn without significant pain relief. She was told that TKR is recommended.

## 2020-12-01 NOTE — H&P PST ADULT - NSICDXPASTMEDICALHX_GEN_ALL_CORE_FT
PAST MEDICAL HISTORY:  Cerebrovascular accident (CVA) 2016 - tx with TPA - no residual    History of short term memory loss started on Aricept for early onset dementia    HTN (hypertension)     Kidney stones s/p lithotripsy     PAST MEDICAL HISTORY:  Cerebrovascular accident (CVA) 2016 - tx with TPA - no residual    Dry eye syndrome     History of short term memory loss started on Aricept for early onset dementia  (11/2020)    HTN (hypertension)     Hyperlipidemia     Kidney stones s/p lithotripsy    Osteoarthritis

## 2020-12-01 NOTE — H&P PST ADULT - NSICDXPASTSURGICALHX_GEN_ALL_CORE_FT
PAST SURGICAL HISTORY:  Benign cyst of breast Excision of bilatral breast cyst 1970's    History of cataract surgery bilateral 2018    History of lithotripsy ESWL June,2020    S/P arthroscopy of left knee 8/2020    S/P ORIF (open reduction internal fixation) fracture right wrist 1993

## 2020-12-02 PROBLEM — I63.9 CEREBRAL INFARCTION, UNSPECIFIED: Chronic | Status: ACTIVE | Noted: 2020-08-06

## 2020-12-02 PROBLEM — Z87.898 PERSONAL HISTORY OF OTHER SPECIFIED CONDITIONS: Chronic | Status: ACTIVE | Noted: 2020-12-01

## 2020-12-02 PROBLEM — H04.129 DRY EYE SYNDROME OF UNSPECIFIED LACRIMAL GLAND: Chronic | Status: ACTIVE | Noted: 2020-12-01

## 2020-12-02 PROBLEM — E78.5 HYPERLIPIDEMIA, UNSPECIFIED: Chronic | Status: ACTIVE | Noted: 2020-12-01

## 2020-12-02 PROBLEM — M19.90 UNSPECIFIED OSTEOARTHRITIS, UNSPECIFIED SITE: Chronic | Status: ACTIVE | Noted: 2020-12-01

## 2020-12-04 RX ORDER — TRANEXAMIC ACID 100 MG/ML
1000 INJECTION, SOLUTION INTRAVENOUS ONCE
Refills: 0 | Status: COMPLETED | OUTPATIENT
Start: 2020-12-08 | End: 2020-12-08

## 2020-12-04 RX ORDER — CHLORHEXIDINE GLUCONATE 213 G/1000ML
1 SOLUTION TOPICAL ONCE
Refills: 0 | Status: COMPLETED | OUTPATIENT
Start: 2020-12-08 | End: 2020-12-08

## 2020-12-04 RX ORDER — ACETAMINOPHEN 500 MG
1000 TABLET ORAL ONCE
Refills: 0 | Status: COMPLETED | OUTPATIENT
Start: 2020-12-08 | End: 2020-12-08

## 2020-12-04 RX ORDER — APREPITANT 80 MG/1
40 CAPSULE ORAL ONCE
Refills: 0 | Status: COMPLETED | OUTPATIENT
Start: 2020-12-08 | End: 2020-12-08

## 2020-12-04 RX ORDER — CEFAZOLIN SODIUM 1 G
2000 VIAL (EA) INJECTION ONCE
Refills: 0 | Status: COMPLETED | OUTPATIENT
Start: 2020-12-08 | End: 2020-12-08

## 2020-12-06 ENCOUNTER — OUTPATIENT (OUTPATIENT)
Dept: OUTPATIENT SERVICES | Facility: HOSPITAL | Age: 80
LOS: 1 days | End: 2020-12-06
Payer: MEDICARE

## 2020-12-06 DIAGNOSIS — Z98.890 OTHER SPECIFIED POSTPROCEDURAL STATES: Chronic | ICD-10-CM

## 2020-12-06 DIAGNOSIS — Z98.49 CATARACT EXTRACTION STATUS, UNSPECIFIED EYE: Chronic | ICD-10-CM

## 2020-12-06 DIAGNOSIS — N60.09 SOLITARY CYST OF UNSPECIFIED BREAST: Chronic | ICD-10-CM

## 2020-12-06 DIAGNOSIS — Z11.59 ENCOUNTER FOR SCREENING FOR OTHER VIRAL DISEASES: ICD-10-CM

## 2020-12-06 LAB — SARS-COV-2 RNA SPEC QL NAA+PROBE: SIGNIFICANT CHANGE UP

## 2020-12-06 PROCEDURE — U0003: CPT

## 2020-12-07 ENCOUNTER — FORM ENCOUNTER (OUTPATIENT)
Age: 80
End: 2020-12-07

## 2020-12-07 NOTE — PATIENT PROFILE ADULT - VISION (WITH CORRECTIVE LENSES IF THE PATIENT USUALLY WEARS THEM):
wears glaasses/Partially impaired: cannot see medication labels or newsprint, but can see obstacles in path, and the surrounding layout; can count fingers at arm's length

## 2020-12-08 ENCOUNTER — RESULT REVIEW (OUTPATIENT)
Age: 80
End: 2020-12-08

## 2020-12-08 ENCOUNTER — TRANSCRIPTION ENCOUNTER (OUTPATIENT)
Age: 80
End: 2020-12-08

## 2020-12-08 ENCOUNTER — INPATIENT (INPATIENT)
Facility: HOSPITAL | Age: 80
LOS: 2 days | Discharge: HOME | DRG: 470 | End: 2020-12-11
Attending: ORTHOPAEDIC SURGERY | Admitting: ORTHOPAEDIC SURGERY
Payer: MEDICARE

## 2020-12-08 ENCOUNTER — APPOINTMENT (OUTPATIENT)
Dept: ORTHOPEDIC SURGERY | Facility: HOSPITAL | Age: 80
End: 2020-12-08

## 2020-12-08 VITALS
HEART RATE: 59 BPM | HEIGHT: 62 IN | RESPIRATION RATE: 14 BRPM | WEIGHT: 131.84 LBS | TEMPERATURE: 99 F | SYSTOLIC BLOOD PRESSURE: 168 MMHG | DIASTOLIC BLOOD PRESSURE: 84 MMHG | OXYGEN SATURATION: 96 %

## 2020-12-08 DIAGNOSIS — Z98.890 OTHER SPECIFIED POSTPROCEDURAL STATES: Chronic | ICD-10-CM

## 2020-12-08 DIAGNOSIS — Z98.49 CATARACT EXTRACTION STATUS, UNSPECIFIED EYE: Chronic | ICD-10-CM

## 2020-12-08 DIAGNOSIS — N60.09 SOLITARY CYST OF UNSPECIFIED BREAST: Chronic | ICD-10-CM

## 2020-12-08 DIAGNOSIS — Z01.818 ENCOUNTER FOR OTHER PREPROCEDURAL EXAMINATION: ICD-10-CM

## 2020-12-08 DIAGNOSIS — M17.12 UNILATERAL PRIMARY OSTEOARTHRITIS, LEFT KNEE: ICD-10-CM

## 2020-12-08 LAB
ABO RH CONFIRMATION: SIGNIFICANT CHANGE UP
ANION GAP SERPL CALC-SCNC: 10 MMOL/L — SIGNIFICANT CHANGE UP (ref 5–17)
ANION GAP SERPL CALC-SCNC: 11 MMOL/L — SIGNIFICANT CHANGE UP (ref 5–17)
BLD GP AB SCN SERPL QL: SIGNIFICANT CHANGE UP
BUN SERPL-MCNC: 16 MG/DL — SIGNIFICANT CHANGE UP (ref 7–23)
BUN SERPL-MCNC: 8 MG/DL — SIGNIFICANT CHANGE UP (ref 7–23)
CALCIUM SERPL-MCNC: 8.4 MG/DL — SIGNIFICANT CHANGE UP (ref 8.4–10.5)
CALCIUM SERPL-MCNC: 8.9 MG/DL — SIGNIFICANT CHANGE UP (ref 8.4–10.5)
CHLORIDE SERPL-SCNC: 106 MMOL/L — SIGNIFICANT CHANGE UP (ref 96–108)
CHLORIDE SERPL-SCNC: 106 MMOL/L — SIGNIFICANT CHANGE UP (ref 96–108)
CK MB CFR SERPL CALC: 1.7 NG/ML — SIGNIFICANT CHANGE UP (ref 0–3.6)
CK SERPL-CCNC: 259 U/L — HIGH (ref 26–192)
CO2 SERPL-SCNC: 22 MMOL/L — SIGNIFICANT CHANGE UP (ref 22–31)
CO2 SERPL-SCNC: 24 MMOL/L — SIGNIFICANT CHANGE UP (ref 22–31)
CREAT SERPL-MCNC: 1.03 MG/DL — SIGNIFICANT CHANGE UP (ref 0.5–1.3)
CREAT SERPL-MCNC: 1.24 MG/DL — SIGNIFICANT CHANGE UP (ref 0.5–1.3)
GLUCOSE BLDC GLUCOMTR-MCNC: 198 MG/DL — HIGH (ref 70–99)
GLUCOSE SERPL-MCNC: 167 MG/DL — HIGH (ref 70–99)
GLUCOSE SERPL-MCNC: 218 MG/DL — HIGH (ref 70–99)
HCT VFR BLD CALC: 28.4 % — LOW (ref 34.5–45)
HCT VFR BLD CALC: 34 % — LOW (ref 34.5–45)
HGB BLD-MCNC: 11.2 G/DL — LOW (ref 11.5–15.5)
HGB BLD-MCNC: 9.2 G/DL — LOW (ref 11.5–15.5)
MAGNESIUM SERPL-MCNC: 1.4 MG/DL — LOW (ref 1.6–2.6)
MCHC RBC-ENTMCNC: 27.2 PG — SIGNIFICANT CHANGE UP (ref 27–34)
MCHC RBC-ENTMCNC: 27.5 PG — SIGNIFICANT CHANGE UP (ref 27–34)
MCHC RBC-ENTMCNC: 32.4 GM/DL — SIGNIFICANT CHANGE UP (ref 32–36)
MCHC RBC-ENTMCNC: 32.9 GM/DL — SIGNIFICANT CHANGE UP (ref 32–36)
MCV RBC AUTO: 83.3 FL — SIGNIFICANT CHANGE UP (ref 80–100)
MCV RBC AUTO: 84 FL — SIGNIFICANT CHANGE UP (ref 80–100)
NRBC # BLD: 0 /100 WBCS — SIGNIFICANT CHANGE UP (ref 0–0)
NRBC # BLD: 0 /100 WBCS — SIGNIFICANT CHANGE UP (ref 0–0)
PHOSPHATE SERPL-MCNC: 2.9 MG/DL — SIGNIFICANT CHANGE UP (ref 2.5–4.5)
PLATELET # BLD AUTO: 128 K/UL — LOW (ref 150–400)
PLATELET # BLD AUTO: 160 K/UL — SIGNIFICANT CHANGE UP (ref 150–400)
POTASSIUM SERPL-MCNC: 3.9 MMOL/L — SIGNIFICANT CHANGE UP (ref 3.5–5.3)
POTASSIUM SERPL-MCNC: 4.1 MMOL/L — SIGNIFICANT CHANGE UP (ref 3.5–5.3)
POTASSIUM SERPL-SCNC: 3.9 MMOL/L — SIGNIFICANT CHANGE UP (ref 3.5–5.3)
POTASSIUM SERPL-SCNC: 4.1 MMOL/L — SIGNIFICANT CHANGE UP (ref 3.5–5.3)
RBC # BLD: 3.38 M/UL — LOW (ref 3.8–5.2)
RBC # BLD: 4.08 M/UL — SIGNIFICANT CHANGE UP (ref 3.8–5.2)
RBC # FLD: 12.2 % — SIGNIFICANT CHANGE UP (ref 10.3–14.5)
RBC # FLD: 12.2 % — SIGNIFICANT CHANGE UP (ref 10.3–14.5)
SODIUM SERPL-SCNC: 139 MMOL/L — SIGNIFICANT CHANGE UP (ref 135–145)
SODIUM SERPL-SCNC: 140 MMOL/L — SIGNIFICANT CHANGE UP (ref 135–145)
TROPONIN I SERPL-MCNC: 0.01 NG/ML — LOW (ref 0.02–0.06)
WBC # BLD: 11.43 K/UL — HIGH (ref 3.8–10.5)
WBC # BLD: 7.75 K/UL — SIGNIFICANT CHANGE UP (ref 3.8–10.5)
WBC # FLD AUTO: 11.43 K/UL — HIGH (ref 3.8–10.5)
WBC # FLD AUTO: 7.75 K/UL — SIGNIFICANT CHANGE UP (ref 3.8–10.5)

## 2020-12-08 PROCEDURE — 73562 X-RAY EXAM OF KNEE 3: CPT | Mod: 26,LT

## 2020-12-08 RX ORDER — LOSARTAN POTASSIUM 100 MG/1
100 TABLET, FILM COATED ORAL DAILY
Refills: 0 | Status: DISCONTINUED | OUTPATIENT
Start: 2020-12-10 | End: 2020-12-11

## 2020-12-08 RX ORDER — HYDRALAZINE HCL 50 MG
25 TABLET ORAL EVERY 8 HOURS
Refills: 0 | Status: DISCONTINUED | OUTPATIENT
Start: 2020-12-08 | End: 2020-12-09

## 2020-12-08 RX ORDER — OXYCODONE HYDROCHLORIDE 5 MG/1
2.5 TABLET ORAL
Refills: 0 | Status: DISCONTINUED | OUTPATIENT
Start: 2020-12-08 | End: 2020-12-09

## 2020-12-08 RX ORDER — MAGNESIUM OXIDE 400 MG ORAL TABLET 241.3 MG
400 TABLET ORAL DAILY
Refills: 0 | Status: DISCONTINUED | OUTPATIENT
Start: 2020-12-08 | End: 2020-12-11

## 2020-12-08 RX ORDER — ASPIRIN/CALCIUM CARB/MAGNESIUM 324 MG
1 TABLET ORAL
Qty: 0 | Refills: 0 | DISCHARGE
Start: 2020-12-08

## 2020-12-08 RX ORDER — CELECOXIB 200 MG/1
1 CAPSULE ORAL
Qty: 60 | Refills: 0
Start: 2020-12-08 | End: 2021-01-06

## 2020-12-08 RX ORDER — CEFAZOLIN SODIUM 1 G
2000 VIAL (EA) INJECTION EVERY 8 HOURS
Refills: 0 | Status: COMPLETED | OUTPATIENT
Start: 2020-12-08 | End: 2020-12-09

## 2020-12-08 RX ORDER — POLYETHYLENE GLYCOL 3350 17 G/17G
17 POWDER, FOR SOLUTION ORAL
Qty: 0 | Refills: 0 | DISCHARGE
Start: 2020-12-08

## 2020-12-08 RX ORDER — ACETAMINOPHEN 500 MG
1000 TABLET ORAL EVERY 8 HOURS
Refills: 0 | Status: DISCONTINUED | OUTPATIENT
Start: 2020-12-09 | End: 2020-12-11

## 2020-12-08 RX ORDER — UBIDECARENONE 100 MG
200 CAPSULE ORAL
Qty: 0 | Refills: 0 | DISCHARGE

## 2020-12-08 RX ORDER — LOSARTAN POTASSIUM 100 MG/1
1 TABLET, FILM COATED ORAL
Qty: 0 | Refills: 0 | DISCHARGE

## 2020-12-08 RX ORDER — ASPIRIN/CALCIUM CARB/MAGNESIUM 324 MG
81 TABLET ORAL
Qty: 0 | Refills: 0 | DISCHARGE
Start: 2020-12-08

## 2020-12-08 RX ORDER — ASPIRIN/CALCIUM CARB/MAGNESIUM 324 MG
81 TABLET ORAL EVERY 12 HOURS
Refills: 0 | Status: DISCONTINUED | OUTPATIENT
Start: 2020-12-09 | End: 2020-12-11

## 2020-12-08 RX ORDER — ASPIRIN/CALCIUM CARB/MAGNESIUM 324 MG
1 TABLET ORAL
Qty: 84 | Refills: 0
Start: 2020-12-08 | End: 2021-01-18

## 2020-12-08 RX ORDER — CHOLECALCIFEROL (VITAMIN D3) 125 MCG
1 CAPSULE ORAL
Qty: 0 | Refills: 0 | DISCHARGE

## 2020-12-08 RX ORDER — SENNA PLUS 8.6 MG/1
2 TABLET ORAL AT BEDTIME
Refills: 0 | Status: DISCONTINUED | OUTPATIENT
Start: 2020-12-08 | End: 2020-12-11

## 2020-12-08 RX ORDER — DONEPEZIL HYDROCHLORIDE 10 MG/1
10 TABLET, FILM COATED ORAL DAILY
Refills: 0 | Status: DISCONTINUED | OUTPATIENT
Start: 2020-12-08 | End: 2020-12-09

## 2020-12-08 RX ORDER — VITAMIN E 100 UNIT
1 CAPSULE ORAL
Qty: 0 | Refills: 0 | DISCHARGE

## 2020-12-08 RX ORDER — ATORVASTATIN CALCIUM 80 MG/1
1 TABLET, FILM COATED ORAL
Qty: 0 | Refills: 0 | DISCHARGE

## 2020-12-08 RX ORDER — POLYETHYLENE GLYCOL 3350 17 G/17G
17 POWDER, FOR SOLUTION ORAL DAILY
Refills: 0 | Status: DISCONTINUED | OUTPATIENT
Start: 2020-12-08 | End: 2020-12-11

## 2020-12-08 RX ORDER — ATORVASTATIN CALCIUM 80 MG/1
20 TABLET, FILM COATED ORAL AT BEDTIME
Refills: 0 | Status: DISCONTINUED | OUTPATIENT
Start: 2020-12-08 | End: 2020-12-11

## 2020-12-08 RX ORDER — SODIUM CHLORIDE 9 MG/ML
1000 INJECTION, SOLUTION INTRAVENOUS
Refills: 0 | Status: DISCONTINUED | OUTPATIENT
Start: 2020-12-08 | End: 2020-12-10

## 2020-12-08 RX ORDER — SENNA PLUS 8.6 MG/1
2 TABLET ORAL
Qty: 0 | Refills: 0 | DISCHARGE
Start: 2020-12-08

## 2020-12-08 RX ORDER — MAGNESIUM SULFATE 500 MG/ML
2 VIAL (ML) INJECTION ONCE
Refills: 0 | Status: COMPLETED | OUTPATIENT
Start: 2020-12-08 | End: 2020-12-08

## 2020-12-08 RX ORDER — DONEPEZIL HYDROCHLORIDE 10 MG/1
10 TABLET, FILM COATED ORAL AT BEDTIME
Refills: 0 | Status: DISCONTINUED | OUTPATIENT
Start: 2020-12-08 | End: 2020-12-08

## 2020-12-08 RX ORDER — ACETAMINOPHEN 500 MG
1000 TABLET ORAL ONCE
Refills: 0 | Status: COMPLETED | OUTPATIENT
Start: 2020-12-08 | End: 2020-12-08

## 2020-12-08 RX ORDER — PANTOPRAZOLE SODIUM 20 MG/1
40 TABLET, DELAYED RELEASE ORAL
Refills: 0 | Status: DISCONTINUED | OUTPATIENT
Start: 2020-12-08 | End: 2020-12-11

## 2020-12-08 RX ORDER — ACETAMINOPHEN 500 MG
1000 TABLET ORAL ONCE
Refills: 0 | Status: COMPLETED | OUTPATIENT
Start: 2020-12-09 | End: 2020-12-09

## 2020-12-08 RX ORDER — SODIUM CHLORIDE 9 MG/ML
1000 INJECTION, SOLUTION INTRAVENOUS
Refills: 0 | Status: DISCONTINUED | OUTPATIENT
Start: 2020-12-08 | End: 2020-12-08

## 2020-12-08 RX ORDER — HYDROMORPHONE HYDROCHLORIDE 2 MG/ML
0.5 INJECTION INTRAMUSCULAR; INTRAVENOUS; SUBCUTANEOUS
Refills: 0 | Status: DISCONTINUED | OUTPATIENT
Start: 2020-12-08 | End: 2020-12-11

## 2020-12-08 RX ORDER — CELECOXIB 200 MG/1
1 CAPSULE ORAL
Qty: 0 | Refills: 0 | DISCHARGE
Start: 2020-12-08

## 2020-12-08 RX ORDER — ONDANSETRON 8 MG/1
4 TABLET, FILM COATED ORAL ONCE
Refills: 0 | Status: DISCONTINUED | OUTPATIENT
Start: 2020-12-08 | End: 2020-12-08

## 2020-12-08 RX ORDER — ACETAMINOPHEN 500 MG
2 TABLET ORAL
Qty: 0 | Refills: 0 | DISCHARGE
Start: 2020-12-08

## 2020-12-08 RX ORDER — CELECOXIB 200 MG/1
200 CAPSULE ORAL EVERY 12 HOURS
Refills: 0 | Status: DISCONTINUED | OUTPATIENT
Start: 2020-12-09 | End: 2020-12-09

## 2020-12-08 RX ORDER — ONDANSETRON 8 MG/1
4 TABLET, FILM COATED ORAL EVERY 6 HOURS
Refills: 0 | Status: DISCONTINUED | OUTPATIENT
Start: 2020-12-08 | End: 2020-12-11

## 2020-12-08 RX ORDER — SODIUM CHLORIDE 9 MG/ML
500 INJECTION INTRAMUSCULAR; INTRAVENOUS; SUBCUTANEOUS ONCE
Refills: 0 | Status: COMPLETED | OUTPATIENT
Start: 2020-12-08 | End: 2020-12-08

## 2020-12-08 RX ORDER — HYDROMORPHONE HYDROCHLORIDE 2 MG/ML
0.5 INJECTION INTRAMUSCULAR; INTRAVENOUS; SUBCUTANEOUS
Refills: 0 | Status: DISCONTINUED | OUTPATIENT
Start: 2020-12-08 | End: 2020-12-08

## 2020-12-08 RX ORDER — MAGNESIUM HYDROXIDE 400 MG/1
30 TABLET, CHEWABLE ORAL DAILY
Refills: 0 | Status: DISCONTINUED | OUTPATIENT
Start: 2020-12-08 | End: 2020-12-11

## 2020-12-08 RX ORDER — OXYCODONE HYDROCHLORIDE 5 MG/1
5 TABLET ORAL
Refills: 0 | Status: DISCONTINUED | OUTPATIENT
Start: 2020-12-08 | End: 2020-12-09

## 2020-12-08 RX ORDER — OMEPRAZOLE 10 MG/1
1 CAPSULE, DELAYED RELEASE ORAL
Qty: 30 | Refills: 1
Start: 2020-12-08 | End: 2021-02-05

## 2020-12-08 RX ORDER — MAGNESIUM OXIDE 400 MG ORAL TABLET 241.3 MG
1 TABLET ORAL
Qty: 0 | Refills: 0 | DISCHARGE

## 2020-12-08 RX ORDER — ASPIRIN/CALCIUM CARB/MAGNESIUM 324 MG
1 TABLET ORAL
Qty: 0 | Refills: 0 | DISCHARGE

## 2020-12-08 RX ADMIN — ATORVASTATIN CALCIUM 20 MILLIGRAM(S): 80 TABLET, FILM COATED ORAL at 21:37

## 2020-12-08 RX ADMIN — Medication 25 MILLIGRAM(S): at 18:53

## 2020-12-08 RX ADMIN — Medication 400 MILLIGRAM(S): at 21:36

## 2020-12-08 RX ADMIN — OXYCODONE HYDROCHLORIDE 2.5 MILLIGRAM(S): 5 TABLET ORAL at 19:43

## 2020-12-08 RX ADMIN — SODIUM CHLORIDE 75 MILLILITER(S): 9 INJECTION, SOLUTION INTRAVENOUS at 11:25

## 2020-12-08 RX ADMIN — CHLORHEXIDINE GLUCONATE 1 APPLICATION(S): 213 SOLUTION TOPICAL at 07:27

## 2020-12-08 RX ADMIN — Medication 1000 MILLIGRAM(S): at 21:38

## 2020-12-08 RX ADMIN — Medication 100 MILLIGRAM(S): at 16:14

## 2020-12-08 RX ADMIN — SODIUM CHLORIDE 500 MILLILITER(S): 9 INJECTION INTRAMUSCULAR; INTRAVENOUS; SUBCUTANEOUS at 12:08

## 2020-12-08 RX ADMIN — Medication 1 DROP(S): at 18:52

## 2020-12-08 RX ADMIN — SODIUM CHLORIDE 100 MILLILITER(S): 9 INJECTION, SOLUTION INTRAVENOUS at 21:36

## 2020-12-08 RX ADMIN — Medication 1000 MILLIGRAM(S): at 16:00

## 2020-12-08 RX ADMIN — Medication 50 GRAM(S): at 23:57

## 2020-12-08 RX ADMIN — SODIUM CHLORIDE 100 MILLILITER(S): 9 INJECTION, SOLUTION INTRAVENOUS at 15:20

## 2020-12-08 RX ADMIN — APREPITANT 40 MILLIGRAM(S): 80 CAPSULE ORAL at 07:27

## 2020-12-08 RX ADMIN — Medication 400 MILLIGRAM(S): at 15:19

## 2020-12-08 RX ADMIN — OXYCODONE HYDROCHLORIDE 2.5 MILLIGRAM(S): 5 TABLET ORAL at 19:05

## 2020-12-08 NOTE — CONSULT NOTE ADULT - SUBJECTIVE AND OBJECTIVE BOX
81 yo F with hx of  HTN, CVA (no residual defects), mild dementia., HLD, Female admitted to [facility] for aftercare following Left total knee arthoplasty    Patient seen and examined at bedside Cardiac Clearances notes: Echo November 2020 with normal EF. EKG with sinus bradycardia  Labs: Baseline Cr at 1.03    ALLERGIES  No Known Allergies    PAST MEDICAL AND SURGICAL  Encounter for other preprocedural examination    Primary osteoarthritis of left knee    Maternal family history of dementia    FH: HTN (hypertension)    Handoff    MEWS Score    Dry eye syndrome    Osteoarthritis    Hyperlipidemia    History of short term memory loss    Kidney stones    Cerebrovascular accident (CVA)    HTN (hypertension)    Primary localized osteoarthritis of left knee    Primary localized osteoarthritis of left knee    Left total knee arthroplasty    Primary osteoarthritis of left knee    TKR (total knee replacement)    S/P arthroscopy of left knee    Benign cyst of breast    History of lithotripsy    History of cataract surgery    S/P ORIF (open reduction internal fixation) fracture        FAMILY HISTORY  Maternal family history of dementia    FH: HTN (hypertension)        SOCIAL HX: denies smoking alcohol recreational drugs    Home meds:  acetaminophen 500 mg oral tablet  Aricept 10 mg oral tablet  aspirin 81 mg oral delayed release tablet  aspirin 81 mg oral tablet  atorvastatin 20 mg oral tablet  Calcium 500+D  celecoxib 200 mg oral capsule  CoQ10  ginko  losartan 100 mg oral tablet  magnesium oxide 400 mg (241.3 mg elemental magnesium) oral tablet  Multiple Vitamins oral tablet  polyethylene glycol 3350 oral powder for reconstitution  Refresh ophthalmic solution  senna oral tablet  Vitamin B Complex 100  Vitamin D3 1000 intl units (25 mcg) oral tablet  vitamin E 400 intl units oral capsule      Review of Systems:  General:denies fever chills, headache, weakness  HEENT: denies blurry vision,diffculty swallowing, difficulty hearing, tinnitus  Cardiovascular: denies chest pain  ,palpitations  Pulmonary:denies shortness of breath, cough, wheezing, hemoptysis  Gastrointestinal: denies abdominal pain, constipation, diarrhea,nausea , vomiting, hematochezia  : denies hematuria, dysuria, or incontinence  Neurological: denies weakness, numbness , tingling, dizziness, tremors  MSK: denies muscle pain, difficulty ambulating, swelling, back pain  skin: denies skin rash, itching, burning, or  skin lesions  Psychiatrical: denies mood disturbances, anxierty, feeling depressed, depression , or difficulty sleeping    Objective:  Vitals  T(C): 36.6 (12-08-20 @ 13:40), Max: 37.1 (12-08-20 @ 06:58)  HR: 62 (12-08-20 @ 13:40) (51 - 66)  BP: 166/61 (12-08-20 @ 13:40) (133/46 - 178/70)  RR: 18 (12-08-20 @ 13:40) (9 - 22)  SpO2: 100% (12-08-20 @ 13:40) (96% - 100%)    Physical Exam:  General: comfortable, no acute distress, well nourished  HEENT: Atraumatic, no LAD, trachea midline, PERRLA  Cardiovascular: normal s1s2, no murmurs, gallops or fricition rubs  Pulmonary: clear to ausculation Bilaterally, no wheezing , rhonchi  Gastrointestinal: soft non tender non distended, no masses felt, no organomegally  Muscloskeletal: no lower extremity edema, intact bilateral lower extremity pulses  Neurological: CN II-12 intact. No focal weakness  Psychiatrical: normal mood, cooperative  SKIN: no rash, lesions or ulcers    Labs:                    Active Medications  MEDICATIONS  (STANDING):  acetaminophen  IVPB .. 1000 milliGRAM(s) IV Intermittent once  acetaminophen  IVPB .. 1000 milliGRAM(s) IV Intermittent once  artificial  tears Solution 1 Drop(s) Both EYES two times a day  atorvastatin 20 milliGRAM(s) Oral at bedtime  ceFAZolin   IVPB 2000 milliGRAM(s) IV Intermittent every 8 hours  donepezil 10 milliGRAM(s) Oral at bedtime  lactated ringers. 1000 milliLiter(s) (100 mL/Hr) IV Continuous <Continuous>  magnesium oxide 400 milliGRAM(s) Oral daily  pantoprazole    Tablet 40 milliGRAM(s) Oral before breakfast  polyethylene glycol 3350 17 Gram(s) Oral daily    MEDICATIONS  (PRN):  aluminum hydroxide/magnesium hydroxide/simethicone Suspension 30 milliLiter(s) Oral four times a day PRN Indigestion  HYDROmorphone  Injectable 0.5 milliGRAM(s) IV Push every 3 hours PRN breakthrough  magnesium hydroxide Suspension 30 milliLiter(s) Oral daily PRN Constipation  ondansetron Injectable 4 milliGRAM(s) IV Push every 6 hours PRN Nausea and/or Vomiting  oxyCODONE    IR 2.5 milliGRAM(s) Oral every 3 hours PRN Moderate Pain (4 - 6)  oxyCODONE    IR 5 milliGRAM(s) Oral every 3 hours PRN Severe Pain (7 - 10)  senna 2 Tablet(s) Oral at bedtime PRN Constipation

## 2020-12-08 NOTE — PROGRESS NOTE ADULT - SUBJECTIVE AND OBJECTIVE BOX
Ortho Post Op Check        Procedure: left TKA  Surgeon: John    Pt comfortable without complaints, pain controlled  Denies CP, SOB, N/V, numbness/tingling.  + voiding  Pain Rx:  acetaminophen  IVPB .. 1000 milliGRAM(s) IV Intermittent once  acetaminophen  IVPB .. 1000 milliGRAM(s) IV Intermittent once  donepezil 10 milliGRAM(s) Oral at bedtime  HYDROmorphone  Injectable 0.5 milliGRAM(s) IV Push every 3 hours PRN  ondansetron Injectable 4 milliGRAM(s) IV Push every 6 hours PRN  oxyCODONE    IR 2.5 milliGRAM(s) Oral every 3 hours PRN  oxyCODONE    IR 5 milliGRAM(s) Oral every 3 hours PRN      General Exam:  Vital Signs Last 24 Hrs  T(C): 36.6 (12-08-20 @ 13:40), Max: 37.1 (12-08-20 @ 10:45)  T(F): 97.8 (12-08-20 @ 13:40), Max: 98.7 (12-08-20 @ 10:45)  HR: 62 (12-08-20 @ 13:40) (51 - 62)  BP: 166/61 (12-08-20 @ 13:40) (133/46 - 167/65)  BP(mean): --  RR: 18 (12-08-20 @ 13:40) (9 - 22)  SpO2: 100% (12-08-20 @ 13:40) (98% - 100%)    General: Pt Alert and oriented, NAD, controlled pain.  Heart: RRR no murmur  Lungs: clear  Abdomen: BS+ soft  Left knee ace dressing dry/intact. Hemovac in place  VTEP: On Venodynes Bilat + BID Ecotrin       A/P: 80yFemale POD#0 s/p left TKA  - Stable  - Pain Control  - DVT ppx: ecotrin  - Post op abx: ancef  - PT eval pending  - Weight bearing status: wbat

## 2020-12-08 NOTE — BRIEF OPERATIVE NOTE - NSICDXBRIEFPOSTOP_GEN_ALL_CORE_FT
POST-OP DIAGNOSIS:  Primary localized osteoarthritis of left knee 08-Dec-2020 10:43:49  Yosef Stanford

## 2020-12-08 NOTE — DISCHARGE NOTE PROVIDER - HOSPITAL COURSE
The patient is a 80  y.o. female with severe osteoarthritis of the left knee.  She was evaluated by the PST dept at [facility] and obtained the appropriate medical clearances.  After admission on 12/8/20 and receiving pre-operative parenteral prophylactic antibiotics, the patient  underwent an  uncomplicated left TKA by orthopedic surgeon Dr. Roberto Lopez.    A medical consultation from the Hospitalist service was obtained for post-operative medical co-management. Typical Physical & occupational therapy modalities post TKA were performed including ambulation training, range of motion, ADL's, and transfers. Ecotrin 81 mg, along w/ bilat venodynes, was given for DVT prophylaxis (caprini 8).  The patient had a clean appearing surgical incision with no sign of surgical site infections and had a stable neuro / vascular exam of the operated extremity.  After progression of mobility guided by the PT/ OT staff,  the patient was felt to benefit from further rehabilitative care for restoration to level of function. This was felt to best be accomplished  at home.  Discharge and Orthopedic Care instructions were delineated in the Discharge Plan and reviewed with the patient. All medications were delineated in the medication reconciliation tool and key points were reviewed with the patient. They were deemed stable from an Orthopedic & medical standpoint for discharge today.  Upon completion of Home care  they will be  following up with Dr. Lopez for office  follow up Orthopedic care.   The patient is a 80  y.o. female with severe osteoarthritis of the left knee.  She was evaluated by the PST dept at [facility] and obtained the appropriate medical clearances.  After admission on 12/8/20 and receiving pre-operative parenteral prophylactic antibiotics, the patient  underwent an  uncomplicated left TKA by orthopedic surgeon Dr. Roberto Lopez.    A medical consultation from the Hospitalist service was obtained for post-operative medical co-management. Typical Physical & occupational therapy modalities post TKA were performed including ambulation training, range of motion, ADL's, and transfers. Ecotrin 81 mg, along w/ bilat venodynes, was given for DVT prophylaxis (caprini 8).  The patient had a clean appearing surgical incision with no sign of surgical site infections and had a stable neuro / vascular exam of the operated extremity.      In the early morning of POD 1, a rapid response was called when patient briefly became unresponsive while using the toilet. There was no trauma or fall. Patient was brought back to bed and regained consciousness. STAT labs were drawn, including cardiac enzymes, which were unremarkable. Patient was placed on remote telemetry. Episode was felt to be likely a vasovagal response.  After progression of mobility guided by the PT/ OT staff,  the patient was felt to benefit from further rehabilitative care for restoration to level of function. This was felt to best be accomplished  at home.  Discharge and Orthopedic Care instructions were delineated in the Discharge Plan and reviewed with the patient. All medications were delineated in the medication reconciliation tool and key points were reviewed with the patient. They were deemed stable from an Orthopedic & medical standpoint for discharge today.  Upon completion of Home care  they will be  following up with Dr. Lopez for office  follow up Orthopedic care.

## 2020-12-08 NOTE — DISCHARGE NOTE PROVIDER - NSDCMRMEDTOKEN_GEN_ALL_CORE_FT
acetaminophen 500 mg oral tablet: 2 tab(s) orally every 8 hours  Aricept 10 mg oral tablet: 1 tab(s) orally once a day  aspirin 81 mg oral delayed release tablet: 1 tab(s) orally every 12 hours  aspirin 81 mg oral tablet: 1 tab(s) orally once a day.  resume when twice daily dosing is complete  atorvastatin 20 mg oral tablet: 1 tab(s) orally once a day (at bedtime)  Calcium 500+D: 750 milligram(s) orally once a day  celecoxib 200 mg oral capsule: 1 cap(s) orally every 12 hours  CoQ10: 200 milligram(s) orally once a day  ginko: 120 milligram(s) orally once a day  losartan 100 mg oral tablet: 1 tab(s) orally once a day (at bedtime)  magnesium oxide 400 mg (241.3 mg elemental magnesium) oral tablet: 1 tab(s) orally once a day  Multiple Vitamins oral tablet: 1 tab(s) orally once a day  omeprazole 20 mg oral delayed release capsule: 1 cap(s) orally once a day  before breakfast   polyethylene glycol 3350 oral powder for reconstitution: 17 gram(s) orally once a day  Refresh ophthalmic solution: 1 drop(s) to each affected eye 2 times a day  senna oral tablet: 2 tab(s) orally once a day (at bedtime), As needed, Constipation  Vitamin B Complex 100: 1 tab(s) orally once a day  Vitamin D3 1000 intl units (25 mcg) oral tablet: 1 tab(s) orally once a day  vitamin E 400 intl units oral capsule: 1 cap(s) orally once a day   acetaminophen 500 mg oral tablet: 2 tab(s) orally every 8 hours  Aricept 10 mg oral tablet: 1 tab(s) orally once a day  aspirin: 81 milligram(s) orally once a day.  Restart when twice daily dosing is complete  aspirin 81 mg oral delayed release tablet: 1 tab(s) orally every 12 hours.  take 2 hours before celebrex  atorvastatin 20 mg oral tablet: 1 tab(s) orally once a day (at bedtime)  Calcium 500+D: 750 milligram(s) orally once a day  celecoxib 200 mg oral capsule: 1 cap(s) orally every 12 hours.  take 2 hrs after ecotrin  CoQ10: 200 milligram(s) orally once a day  ginko: 120 milligram(s) orally once a day  losartan 100 mg oral tablet: 1 tab(s) orally once a day (at bedtime)  magnesium oxide 400 mg (241.3 mg elemental magnesium) oral tablet: 1 tab(s) orally once a day  Multiple Vitamins oral tablet: 1 tab(s) orally once a day  omeprazole 20 mg oral delayed release capsule: 1 cap(s) orally once a day  before breakfast   oxyCODONE 5 mg oral tablet: 0.5 tab(s) orally every 4 hours asneeded for moderate pain MDD:6  polyethylene glycol 3350 oral powder for reconstitution: 17 gram(s) orally once a day  Refresh ophthalmic solution: 1 drop(s) to each affected eye 2 times a day  senna oral tablet: 2 tab(s) orally once a day (at bedtime), As needed, Constipation  Vitamin B Complex 100: 1 tab(s) orally once a day  Vitamin D3 1000 intl units (25 mcg) oral tablet: 1 tab(s) orally once a day  vitamin E 400 intl units oral capsule: 1 cap(s) orally once a day   acetaminophen 500 mg oral tablet: 2 tab(s) orally every 8 hours  Aricept 10 mg oral tablet: 1 tab(s) orally once a day  aspirin: 81 milligram(s) orally once a day.  Restart when twice daily dosing is complete  aspirin 81 mg oral delayed release tablet: 1 tab(s) orally every 12 hours.  take 2 hours before celebrex  atorvastatin 20 mg oral tablet: 1 tab(s) orally once a day (at bedtime)  Calcium 500+D: 750 milligram(s) orally once a day  celecoxib 100 mg oral capsule: 1 cap(s) orally every 12 hours. Take at least 2 hours after Aspirin.  CoQ10: 200 milligram(s) orally once a day  ginko: 120 milligram(s) orally once a day  losartan 100 mg oral tablet: 1 tab(s) orally once a day (at bedtime)  magnesium oxide 400 mg (241.3 mg elemental magnesium) oral tablet: 1 tab(s) orally once a day  Multiple Vitamins oral tablet: 1 tab(s) orally once a day  omeprazole 20 mg oral delayed release capsule: 1 cap(s) orally once a day  before breakfast   oxyCODONE 5 mg oral tablet: 0.5 tab(s) orally every 4 hours asneeded for moderate pain MDD:6  polyethylene glycol 3350 oral powder for reconstitution: 17 gram(s) orally once a day  Refresh ophthalmic solution: 1 drop(s) to each affected eye 2 times a day  senna oral tablet: 2 tab(s) orally once a day (at bedtime), As needed, Constipation  Vitamin B Complex 100: 1 tab(s) orally once a day  Vitamin D3 1000 intl units (25 mcg) oral tablet: 1 tab(s) orally once a day  vitamin E 400 intl units oral capsule: 1 cap(s) orally once a day   acetaminophen 500 mg oral tablet: 2 tab(s) orally every 8 hours  aspirin: 81 milligram(s) orally once a day.  Restart when twice daily dosing is complete  aspirin 81 mg oral delayed release tablet: 1 tab(s) orally every 12 hours.  take 2 hours before celebrex  atorvastatin 20 mg oral tablet: 1 tab(s) orally once a day (at bedtime)  Calcium 500+D: 750 milligram(s) orally once a day  celecoxib 100 mg oral capsule: 1 cap(s) orally every 12 hours. Take at least 2 hours after Aspirin.  CoQ10: 200 milligram(s) orally once a day  ginko: 120 milligram(s) orally once a day  losartan 100 mg oral tablet: 1 tab(s) orally once a day (at bedtime)  magnesium oxide 400 mg (241.3 mg elemental magnesium) oral tablet: 1 tab(s) orally once a day  Multiple Vitamins oral tablet: 1 tab(s) orally once a day  omeprazole 20 mg oral delayed release capsule: 1 cap(s) orally once a day  before breakfast   oxyCODONE 5 mg oral tablet: 0.5 tab(s) orally every 4 hours asneeded for moderate pain MDD:6  polyethylene glycol 3350 oral powder for reconstitution: 17 gram(s) orally once a day  Refresh ophthalmic solution: 1 drop(s) to each affected eye 2 times a day  senna oral tablet: 2 tab(s) orally once a day (at bedtime), As needed, Constipation  traMADol 50 mg oral tablet: 0.5 tab(s) orally every 4 hours, As needed for moderate to severe pain MDD:3  [id number]  Vitamin B Complex 100: 1 tab(s) orally once a day  Vitamin D3 1000 intl units (25 mcg) oral tablet: 1 tab(s) orally once a day  vitamin E 400 intl units oral capsule: 1 cap(s) orally once a day   acetaminophen 500 mg oral tablet: 2 tab(s) orally every 8 hours  aspirin: 81 milligram(s) orally once a day.  Restart when twice daily dosing is complete  aspirin 81 mg oral delayed release tablet: 1 tab(s) orally every 12 hours.  take 2 hours before celebrex  atorvastatin 20 mg oral tablet: 1 tab(s) orally once a day (at bedtime)  Calcium 500+D: 750 milligram(s) orally once a day  celecoxib 100 mg oral capsule: 1 cap(s) orally every 12 hours. Take at least 2 hours after Aspirin.  CoQ10: 200 milligram(s) orally once a day  ginko: 120 milligram(s) orally once a day  losartan 100 mg oral tablet: 1 tab(s) orally once a day (at bedtime)  magnesium oxide 400 mg (241.3 mg elemental magnesium) oral tablet: 1 tab(s) orally once a day  Multiple Vitamins oral tablet: 1 tab(s) orally once a day  omeprazole 20 mg oral delayed release capsule: 1 cap(s) orally once a day  before breakfast   polyethylene glycol 3350 oral powder for reconstitution: 17 gram(s) orally once a day  Refresh ophthalmic solution: 1 drop(s) to each affected eye 2 times a day  senna oral tablet: 2 tab(s) orally once a day (at bedtime), As needed, Constipation  traMADol 50 mg oral tablet: 0.5 tab(s) orally every 4 hours, As needed for moderate to severe pain MDD:3  [id number]  Vitamin B Complex 100: 1 tab(s) orally once a day  Vitamin D3 1000 intl units (25 mcg) oral tablet: 1 tab(s) orally once a day  vitamin E 400 intl units oral capsule: 1 cap(s) orally once a day

## 2020-12-08 NOTE — DISCHARGE NOTE PROVIDER - CARE PROVIDER_API CALL
Roberto Lopez  ORTHOPAEDIC SURGERY  [address], [address]  [place] [address]  Phone: [phone]  Fax: [phone]  Established Patient  Follow Up Time: 2 weeks

## 2020-12-08 NOTE — PROGRESS NOTE ADULT - SUBJECTIVE AND OBJECTIVE BOX
POST OPERATIVE DAY #:  [ 0 ]   STATUS POST: [ L TKR ]                       SUBJECTIVE: Patient seen and examined [ confortable without complaints    ]     Pain (0-10): 0    OBJECTIVE:   Vital Signs Last 24 Hrs  T(C): 36.9 (08 Dec 2020 15:00), Max: 37.1 (08 Dec 2020 06:58)  T(F): 98.5 (08 Dec 2020 15:00), Max: 98.7 (08 Dec 2020 06:58)  HR: 70 (08 Dec 2020 15:00) (51 - 70)  BP: 169/80 (08 Dec 2020 15:00) (133/46 - 178/70)  RR: 16 (08 Dec 2020 15:00) (9 - 22)  SpO2: 97% (08 Dec 2020 15:00) (96% - 100%)\par Constitutional: Pleasant in no acute distress  Psych:A&Ox3    Left Knee:          Dressing: [ x ] clean/dry/intact  [ ] Other:           Drains:  drains 24hour output         Sensation: [x ]          [ ] Upper extremity                ax        mc           m          u          r                                                         R          +           +             +           +          +                                               L           +           +             +           +          +         [x ] Lower extremity             sp         dp         saph       jennings         tibial                                                R          +           +             +           +          +                                               L           +           +             +           +          +           Motor exam: [x  ]          [ ] Upper extremity              Bi(c5)  WE(c6)  EE(c7)   FF(c8)                                                R         5/5        5/5        5/5       5/5                                               L          5/5        5/5        5/5       5/5         [x ] Lower extremity          HF(l2)   KE(l3)    TA(l4)   EHL(l5)  GS(s1)                                                 R        5/5        5/5        5/5       5/5         5/5                                               L         5/5        5/5       5/5       5/5          5/5                                                        [ x] warm well perfused; capillary refill <3 seconds                                 LABS: pending        RADIOLOGY & ADDITIONAL STUDIES: post op xrays show left knee implant in excellent position.      A/P :  80y Female S/P L TKR        POD# 0  -    Pain control  -    DVT ppx: [x ]SCDs[x ] Pharmacologic    -    ADAT  -    Check AM labs    -    Monitor Drain Output     -    Resume home meds  -    Physical Therapy  -    Weight bearing status: WBAT [x ]        PWB    [ ]     TTWB  [ ]      NWB  [ ]  -    Dispo: Home [ x]     Acute Rehab [ ]      JAIRON [ ]

## 2020-12-08 NOTE — DISCHARGE NOTE PROVIDER - NSDCCPCAREPLAN_GEN_ALL_CORE_FT
PRINCIPAL DISCHARGE DIAGNOSIS  Diagnosis: Primary osteoarthritis of left knee  Assessment and Plan of Treatment: left TKA  Physical Therapy/Occupational Therapy for: ambulation, transfers, stairs, ADL's (activities of daily living), range of motion exercises, and isometrics  -Activity  • Weight Bearing as tolerated with rolling walker.  • Take short, frequent walks increasing the distance that you walk each day as tolerated.  • Change your position every hour to decrease pain and stiffness.  • Continue the exercises taught to you by your physical therapist.  • No driving until cleared by the doctor.  • No tub baths, hot tubs, or swimming pools until instructed by your doctor.  • Do not squat down on the floor.  • Do not kneel or twist your knee.  • Range of Motion Goals: Flexion= 120 degrees, Extension = 0 degrees  Keep incision clean and dry. May shower 5 days after surgery if no drainage from incision.  Suture/Prineo removal 2 weeks after surgery at Surgeon's office.         PRINCIPAL DISCHARGE DIAGNOSIS  Diagnosis: Primary osteoarthritis of left knee  Assessment and Plan of Treatment: left TKA  Physical Therapy/Occupational Therapy for: ambulation, transfers, stairs, ADL's (activities of daily living), range of motion exercises, and isometrics  -Activity  • Weight Bearing as tolerated with rolling walker.  • Take short, frequent walks increasing the distance that you walk each day as tolerated.  • Change your position every hour to decrease pain and stiffness.  • Continue the exercises taught to you by your physical therapist.  • No driving until cleared by the doctor.  • No tub baths, hot tubs, or swimming pools until instructed by your doctor.  • Do not squat down on the floor.  • Do not kneel or twist your knee.  • Range of Motion Goals: Flexion= 120 degrees, Extension = 0 degrees  Keep incision clean and dry. May leave incision open to air on post op day 2 if no drainage present. May shower 3 days after surgery if no drainage from incision. If drainage present, perform daily dry dressing changes until dry.  Suture removal 2 weeks after surgery at Surgeon's office.         PRINCIPAL DISCHARGE DIAGNOSIS  Diagnosis: Primary osteoarthritis of left knee  Assessment and Plan of Treatment: left TKA  Physical Therapy/Occupational Therapy for: ambulation, transfers, stairs, ADL's (activities of daily living), range of motion exercises, and isometrics  -Activity  • Weight Bearing as tolerated with rolling walker.  • Take short, frequent walks increasing the distance that you walk each day as tolerated.  • Change your position every hour to decrease pain and stiffness.  • Continue the exercises taught to you by your physical therapist.  • No driving until cleared by the doctor.  • No tub baths, hot tubs, or swimming pools until instructed by your doctor.  • Do not squat down on the floor.  • Do not kneel or twist your knee.  • Range of Motion Goals: Flexion= 120 degrees, Extension = 0 degrees  Keep incision clean and dry. May leave incision open to air on post op day 2 if no drainage present. May shower 3 days after surgery if no drainage from incision. If drainage present, perform daily dry dressing changes until dry.  Suture removal 2 weeks after surgery at Surgeon's office.        SECONDARY DISCHARGE DIAGNOSES  Diagnosis: Hypertension  Assessment and Plan of Treatment: continue losartan  if no another med eventually needs to be added, AV leon blockers due to intermittent bradycardia on telemetry

## 2020-12-08 NOTE — CHART NOTE - NSCHARTNOTEFT_GEN_A_CORE
Responded to a RAPID RESPONSE.  Patient was found to be unconscious while on the toilet.  A nursing assistant was with her when she was on the toilet so no trauma resulted from syncope.  Arrived to find the patient sitting on the toilet but then was being assisted to the bed.  Patient regained consciousness and was able to respond appropriately to questions and was are of her surroundings.  Patient denied any acute pain such as chest pain or headaches.  Prior to incident, patient was doing well.  Received hydralazine at 1853 and oxycodone 2.5mg at 1905.          PHYSICAL EXAM:  Vital Signs Last 24 Hrs  HR: 46 (increased to 60s)  BP: 124/60  SpO2: 100% on 4L    GENERAL:     NAD, slightly tired and lethargic appearing  HEAD:     atraumatic, normocephalic  RESPIRATORY:     clear to auscultation bilaterally, no rales or rhonchi or wheezing or rubs  CARDIOVASCULAR:     bradycardic, no murmurs or rubs or gallops, 2+ peripheral pulses  GASTROINTESTINAL:     soft, nontender, nondistended  MUSCULOSKELETAL:     +left knee in bandage  NERVOUS SYSTEM:     able to move upper extremities without any difficulties  PSYCH:     appropriate    EKG:  NSR (similar to previous EKG)    A/P:  80F with HTN, hx of CVA, mild dementia s/p left knee replacement today found to have syncopized while on the toilet.  Likely vasovagal.  Patient with no acute symptoms now and is improving.      - check STAT labs such as CBC, BMP, cardiac enzymes  - limit BP and pain meds  - continue with hydration   - no indication for CT head right now  - remote telemetry to monitor for arrhythmia Responded to a RAPID RESPONSE.  Patient was found to be unconscious while on the toilet.  A nursing assistant was with her when she was on the toilet so no trauma resulted from syncope.  Arrived to find the patient sitting on the toilet but then was being assisted to the bed.  Patient regained consciousness and was able to respond appropriately to questions and was aware of her surroundings.  Patient denied any acute pain such as chest pain or headaches.  Prior to incident, patient was doing well.  Received hydralazine at 1853 and oxycodone 2.5mg at 1905.          PHYSICAL EXAM:  Vital Signs Last 24 Hrs  HR: 46 (increased to 60s)  BP: 124/60  SpO2: 100% on 4L    GENERAL:     NAD, slightly tired and lethargic appearing  HEAD:     atraumatic, normocephalic  RESPIRATORY:     clear to auscultation bilaterally, no rales or rhonchi or wheezing or rubs  CARDIOVASCULAR:     bradycardic, no murmurs or rubs or gallops, 2+ peripheral pulses  GASTROINTESTINAL:     soft, nontender, nondistended  MUSCULOSKELETAL:     +left knee in bandage  NERVOUS SYSTEM:     able to move upper extremities without any difficulties  PSYCH:     appropriate    EKG:  NSR (similar to previous EKG)    A/P:  80F with HTN, hx of CVA, mild dementia s/p left knee replacement today found to have syncopized while on the toilet.  Likely vasovagal.  Patient with no acute symptoms now and is improving.      - check STAT labs such as CBC, BMP, cardiac enzymes  - limit BP and pain meds  - continue with hydration   - no indication for CT head right now  - remote telemetry to monitor for arrhythmia

## 2020-12-08 NOTE — ASU PATIENT PROFILE, ADULT - PMH
Cerebrovascular accident (CVA)  2016 - tx with TPA - no residual  Dry eye syndrome    History of short term memory loss  started on Aricept for early onset dementia  (11/2020)  HTN (hypertension)    Hyperlipidemia    Kidney stones  s/p lithotripsy  Osteoarthritis

## 2020-12-08 NOTE — BRIEF OPERATIVE NOTE - NSICDXBRIEFPREOP_GEN_ALL_CORE_FT
PRE-OP DIAGNOSIS:  Primary localized osteoarthritis of left knee 08-Dec-2020 10:43:35  Yosef Stanford

## 2020-12-08 NOTE — DISCHARGE NOTE PROVIDER - INSTRUCTIONS
regular  For Constipation :   • Increase your water intake. Drink at least 8 glasses of water daily.  • Try adding fiber to your diet by eating fruits, vegetables and foods that are rich in grains.  • If you do experience constipation, you may take an over-the-counter stool softener/laxative such as Misty Colace, Senekot, miralax or  Milk of Magnesia.

## 2020-12-08 NOTE — CONSULT NOTE ADULT - ASSESSMENT
79 yo F with hx of  HTN, CVA (no residual defects), mild dementia., HLD, Female admitted to [facility] for aftercare following Left total knee arthoplasty    Osteoarthritis of Left knee  S/P Total left knee arthoplasty  Aftercare following surgery  -WBAT  -PT/OT  -Early ambulation  -Pain management  -Incentive Spirometry  -DVT ppx as per ortho    HTN  hold home losartan  start hydrazaline PO 25 q8h until pod day 2    HLD  c/w home atorvastatin    CVA  c/w home dose of baby asa    Mild dementia  can likely hold aricept until discharge

## 2020-12-08 NOTE — OCCUPATIONAL THERAPY INITIAL EVALUATION ADULT - ADDITIONAL COMMENTS
Lives with daughter in a split level home 1 KEEGAN 7 +7 steps +railing inside the home.  +tub with doors  PTA pt using a SAC

## 2020-12-08 NOTE — DISCHARGE NOTE PROVIDER - NSDCACTIVITY_GEN_ALL_CORE
Showering allowed/Stairs allowed/Walking - Indoors allowed/Do not drive or operate machinery/Walking - Outdoors allowed/No heavy lifting/straining

## 2020-12-08 NOTE — PHYSICAL THERAPY INITIAL EVALUATION ADULT - ADDITIONAL COMMENTS
Pt lives with daughter in a house w/ 1 step to enter through the side door, 7+7 steps inside with rail.  Pt has rolling walker and straight cane

## 2020-12-08 NOTE — ASU PATIENT PROFILE, ADULT - PSH
Benign cyst of breast  Excision of bilatral breast cyst 1970's  History of cataract surgery  bilateral 2018  History of lithotripsy  [facility] June,2020  S/P arthroscopy of left knee  8/2020  S/P ORIF (open reduction internal fixation) fracture  right wrist 1993

## 2020-12-08 NOTE — DISCHARGE NOTE PROVIDER - NSDCHHNEEDSERVICE_GEN_ALL_CORE
Other, specify.../Rehabilitation services/Teaching and training/Medication teaching and assessment/Observation and assessment/Wound care and assessment

## 2020-12-08 NOTE — DISCHARGE NOTE PROVIDER - NSDCCPTREATMENT_GEN_ALL_CORE_FT
PRINCIPAL PROCEDURE  Procedure: Left total knee arthroplasty  Findings and Treatment: osteoarthritis left knee

## 2020-12-08 NOTE — PHYSICAL THERAPY INITIAL EVALUATION ADULT - GAIT DEVIATIONS NOTED, PT EVAL
decreased stride length/decreased velocity of limb motion/decreased step length/decreased weight-shifting ability/decreased ian

## 2020-12-08 NOTE — ASU PATIENT PROFILE, ADULT - VISION (WITH CORRECTIVE LENSES IF THE PATIENT USUALLY WEARS THEM):
Partially impaired: cannot see medication labels or newsprint, but can see obstacles in path, and the surrounding layout; can count fingers at arm's length/wears glaasses

## 2020-12-08 NOTE — PHYSICAL THERAPY INITIAL EVALUATION ADULT - GAIT TRAINING, PT EVAL
Goals 1-2 days, Pt will ambulate 150 ft w/ rolling walker independently.   Pt will negotiate 10 steps with rail and straight cane with supervision

## 2020-12-09 ENCOUNTER — TRANSCRIPTION ENCOUNTER (OUTPATIENT)
Age: 80
End: 2020-12-09

## 2020-12-09 ENCOUNTER — NON-APPOINTMENT (OUTPATIENT)
Age: 80
End: 2020-12-09

## 2020-12-09 DIAGNOSIS — M17.12 UNILATERAL PRIMARY OSTEOARTHRITIS, LEFT KNEE: ICD-10-CM

## 2020-12-09 LAB
A1C WITH ESTIMATED AVERAGE GLUCOSE RESULT: 5.6 % — SIGNIFICANT CHANGE UP (ref 4–5.6)
ANION GAP SERPL CALC-SCNC: 8 MMOL/L — SIGNIFICANT CHANGE UP (ref 5–17)
ANION GAP SERPL CALC-SCNC: 9 MMOL/L — SIGNIFICANT CHANGE UP (ref 5–17)
BUN SERPL-MCNC: 13 MG/DL — SIGNIFICANT CHANGE UP (ref 7–23)
BUN SERPL-MCNC: 17 MG/DL — SIGNIFICANT CHANGE UP (ref 7–23)
CALCIUM SERPL-MCNC: 9 MG/DL — SIGNIFICANT CHANGE UP (ref 8.4–10.5)
CALCIUM SERPL-MCNC: 9 MG/DL — SIGNIFICANT CHANGE UP (ref 8.4–10.5)
CHLORIDE SERPL-SCNC: 108 MMOL/L — SIGNIFICANT CHANGE UP (ref 96–108)
CHLORIDE SERPL-SCNC: 109 MMOL/L — HIGH (ref 96–108)
CO2 SERPL-SCNC: 24 MMOL/L — SIGNIFICANT CHANGE UP (ref 22–31)
CO2 SERPL-SCNC: 24 MMOL/L — SIGNIFICANT CHANGE UP (ref 22–31)
CREAT SERPL-MCNC: 1.08 MG/DL — SIGNIFICANT CHANGE UP (ref 0.5–1.3)
CREAT SERPL-MCNC: 1.17 MG/DL — SIGNIFICANT CHANGE UP (ref 0.5–1.3)
ESTIMATED AVERAGE GLUCOSE: 114 MG/DL — SIGNIFICANT CHANGE UP (ref 68–114)
GLUCOSE BLDC GLUCOMTR-MCNC: 104 MG/DL — HIGH (ref 70–99)
GLUCOSE BLDC GLUCOMTR-MCNC: 130 MG/DL — HIGH (ref 70–99)
GLUCOSE SERPL-MCNC: 120 MG/DL — HIGH (ref 70–99)
GLUCOSE SERPL-MCNC: 143 MG/DL — HIGH (ref 70–99)
HCT VFR BLD CALC: 26.1 % — LOW (ref 34.5–45)
HCT VFR BLD CALC: 29 % — LOW (ref 34.5–45)
HGB BLD-MCNC: 8.5 G/DL — LOW (ref 11.5–15.5)
HGB BLD-MCNC: 9.5 G/DL — LOW (ref 11.5–15.5)
MCHC RBC-ENTMCNC: 27.1 PG — SIGNIFICANT CHANGE UP (ref 27–34)
MCHC RBC-ENTMCNC: 27.3 PG — SIGNIFICANT CHANGE UP (ref 27–34)
MCHC RBC-ENTMCNC: 32.6 GM/DL — SIGNIFICANT CHANGE UP (ref 32–36)
MCHC RBC-ENTMCNC: 32.8 GM/DL — SIGNIFICANT CHANGE UP (ref 32–36)
MCV RBC AUTO: 82.9 FL — SIGNIFICANT CHANGE UP (ref 80–100)
MCV RBC AUTO: 83.9 FL — SIGNIFICANT CHANGE UP (ref 80–100)
MELD SCORE WITH DIALYSIS: 8 POINTS — SIGNIFICANT CHANGE UP
NRBC # BLD: 0 /100 WBCS — SIGNIFICANT CHANGE UP (ref 0–0)
NRBC # BLD: 0 /100 WBCS — SIGNIFICANT CHANGE UP (ref 0–0)
PLATELET # BLD AUTO: 121 K/UL — LOW (ref 150–400)
PLATELET # BLD AUTO: 145 K/UL — LOW (ref 150–400)
POTASSIUM SERPL-MCNC: 4.3 MMOL/L — SIGNIFICANT CHANGE UP (ref 3.5–5.3)
POTASSIUM SERPL-MCNC: 4.4 MMOL/L — SIGNIFICANT CHANGE UP (ref 3.5–5.3)
POTASSIUM SERPL-SCNC: 4.3 MMOL/L — SIGNIFICANT CHANGE UP (ref 3.5–5.3)
POTASSIUM SERPL-SCNC: 4.4 MMOL/L — SIGNIFICANT CHANGE UP (ref 3.5–5.3)
RBC # BLD: 3.11 M/UL — LOW (ref 3.8–5.2)
RBC # BLD: 3.5 M/UL — LOW (ref 3.8–5.2)
RBC # FLD: 12.3 % — SIGNIFICANT CHANGE UP (ref 10.3–14.5)
RBC # FLD: 12.4 % — SIGNIFICANT CHANGE UP (ref 10.3–14.5)
SODIUM SERPL-SCNC: 141 MMOL/L — SIGNIFICANT CHANGE UP (ref 135–145)
SODIUM SERPL-SCNC: 141 MMOL/L — SIGNIFICANT CHANGE UP (ref 135–145)
TROPONIN I SERPL-MCNC: 0.01 NG/ML — LOW (ref 0.02–0.06)
WBC # BLD: 10.03 K/UL — SIGNIFICANT CHANGE UP (ref 3.8–10.5)
WBC # BLD: 13.04 K/UL — HIGH (ref 3.8–10.5)
WBC # FLD AUTO: 10.03 K/UL — SIGNIFICANT CHANGE UP (ref 3.8–10.5)
WBC # FLD AUTO: 13.04 K/UL — HIGH (ref 3.8–10.5)

## 2020-12-09 PROCEDURE — 99233 SBSQ HOSP IP/OBS HIGH 50: CPT

## 2020-12-09 PROCEDURE — 93010 ELECTROCARDIOGRAM REPORT: CPT

## 2020-12-09 PROCEDURE — 99291 CRITICAL CARE FIRST HOUR: CPT

## 2020-12-09 RX ORDER — CELECOXIB 200 MG/1
100 CAPSULE ORAL EVERY 12 HOURS
Refills: 0 | Status: DISCONTINUED | OUTPATIENT
Start: 2020-12-09 | End: 2020-12-11

## 2020-12-09 RX ORDER — TRAMADOL HYDROCHLORIDE 50 MG/1
50 TABLET ORAL EVERY 4 HOURS
Refills: 0 | Status: DISCONTINUED | OUTPATIENT
Start: 2020-12-09 | End: 2020-12-11

## 2020-12-09 RX ORDER — LOSARTAN POTASSIUM 100 MG/1
50 TABLET, FILM COATED ORAL ONCE
Refills: 0 | Status: COMPLETED | OUTPATIENT
Start: 2020-12-09 | End: 2020-12-09

## 2020-12-09 RX ORDER — CELECOXIB 200 MG/1
1 CAPSULE ORAL
Qty: 60 | Refills: 0
Start: 2020-12-09 | End: 2021-01-07

## 2020-12-09 RX ORDER — HYDRALAZINE HCL 50 MG
10 TABLET ORAL EVERY 8 HOURS
Refills: 0 | Status: DISCONTINUED | OUTPATIENT
Start: 2020-12-09 | End: 2020-12-09

## 2020-12-09 RX ORDER — TRAMADOL HYDROCHLORIDE 50 MG/1
25 TABLET ORAL EVERY 4 HOURS
Refills: 0 | Status: DISCONTINUED | OUTPATIENT
Start: 2020-12-09 | End: 2020-12-11

## 2020-12-09 RX ORDER — SODIUM CHLORIDE 9 MG/ML
500 INJECTION INTRAMUSCULAR; INTRAVENOUS; SUBCUTANEOUS ONCE
Refills: 0 | Status: COMPLETED | OUTPATIENT
Start: 2020-12-09 | End: 2020-12-09

## 2020-12-09 RX ORDER — ASPIRIN/CALCIUM CARB/MAGNESIUM 324 MG
1 TABLET ORAL
Qty: 0 | Refills: 0 | DISCHARGE

## 2020-12-09 RX ORDER — OXYCODONE HYDROCHLORIDE 5 MG/1
0.5 TABLET ORAL
Qty: 30 | Refills: 0
Start: 2020-12-09

## 2020-12-09 RX ADMIN — Medication 100 MILLIGRAM(S): at 00:08

## 2020-12-09 RX ADMIN — Medication 1000 MILLIGRAM(S): at 17:02

## 2020-12-09 RX ADMIN — Medication 400 MILLIGRAM(S): at 03:23

## 2020-12-09 RX ADMIN — TRAMADOL HYDROCHLORIDE 25 MILLIGRAM(S): 50 TABLET ORAL at 12:29

## 2020-12-09 RX ADMIN — CELECOXIB 100 MILLIGRAM(S): 200 CAPSULE ORAL at 17:05

## 2020-12-09 RX ADMIN — Medication 81 MILLIGRAM(S): at 06:04

## 2020-12-09 RX ADMIN — SODIUM CHLORIDE 1000 MILLILITER(S): 9 INJECTION INTRAMUSCULAR; INTRAVENOUS; SUBCUTANEOUS at 08:14

## 2020-12-09 RX ADMIN — LOSARTAN POTASSIUM 50 MILLIGRAM(S): 100 TABLET, FILM COATED ORAL at 10:04

## 2020-12-09 RX ADMIN — ATORVASTATIN CALCIUM 20 MILLIGRAM(S): 80 TABLET, FILM COATED ORAL at 21:35

## 2020-12-09 RX ADMIN — Medication 10 MILLIGRAM(S): at 06:36

## 2020-12-09 RX ADMIN — POLYETHYLENE GLYCOL 3350 17 GRAM(S): 17 POWDER, FOR SOLUTION ORAL at 12:25

## 2020-12-09 RX ADMIN — Medication 1000 MILLIGRAM(S): at 08:14

## 2020-12-09 RX ADMIN — SODIUM CHLORIDE 100 MILLILITER(S): 9 INJECTION, SOLUTION INTRAVENOUS at 08:14

## 2020-12-09 RX ADMIN — Medication 1 DROP(S): at 17:01

## 2020-12-09 RX ADMIN — Medication 1000 MILLIGRAM(S): at 23:24

## 2020-12-09 RX ADMIN — PANTOPRAZOLE SODIUM 40 MILLIGRAM(S): 20 TABLET, DELAYED RELEASE ORAL at 06:05

## 2020-12-09 RX ADMIN — Medication 1000 MILLIGRAM(S): at 17:01

## 2020-12-09 RX ADMIN — Medication 1000 MILLIGRAM(S): at 03:24

## 2020-12-09 RX ADMIN — CELECOXIB 200 MILLIGRAM(S): 200 CAPSULE ORAL at 08:14

## 2020-12-09 RX ADMIN — CELECOXIB 100 MILLIGRAM(S): 200 CAPSULE ORAL at 17:01

## 2020-12-09 RX ADMIN — DONEPEZIL HYDROCHLORIDE 10 MILLIGRAM(S): 10 TABLET, FILM COATED ORAL at 08:13

## 2020-12-09 RX ADMIN — CELECOXIB 200 MILLIGRAM(S): 200 CAPSULE ORAL at 08:12

## 2020-12-09 RX ADMIN — Medication 1000 MILLIGRAM(S): at 08:13

## 2020-12-09 RX ADMIN — TRAMADOL HYDROCHLORIDE 25 MILLIGRAM(S): 50 TABLET ORAL at 13:10

## 2020-12-09 RX ADMIN — Medication 1 DROP(S): at 06:05

## 2020-12-09 RX ADMIN — MAGNESIUM OXIDE 400 MG ORAL TABLET 400 MILLIGRAM(S): 241.3 TABLET ORAL at 12:25

## 2020-12-09 RX ADMIN — Medication 81 MILLIGRAM(S): at 17:01

## 2020-12-09 NOTE — PROGRESS NOTE ADULT - ASSESSMENT
79 yo F with hx of  HTN, CVA (no residual defects), mild dementia., HLD, Female admitted to [facility] for aftercare following Left total knee arthoplasty    POD#1 s/p LEFT TKR  - Pain control  - Bowel regimen  - PT/OT  - Incentive spirometry  - VTE PPx - ASA BID     HTN  hold home losartan until POD#2  DC hydralazine    HLD  c/w home atorvastatin    CVA  resume ASA daily once completes BID dosing for VTE PPx    Mild dementia  can hold aricept until discharge   79 yo F with hx of  HTN, CVA (no residual defects), mild dementia., HLD, Female admitted to [facility] for aftercare following Left total knee arthoplasty    POD#1 s/p LEFT TKR  - Pain control  - Bowel regimen  - PT/OT  - Incentive spirometry  - VTE PPx - ASA BID    RRT for syncope  - likely vasovagal and/or related to opioids  - H/H stable over last 24 hours  - DC oxycodone, patient wants to stick to tylenol, will add tramadol PRN  - has ambulated to bathroom without dizziness/syncope today  - blocked PACs on tele with pauses - cardio eval pending    Hyperglycemia  - patient denies any hx of prediabetes/diabetes  - check A1c  - may be transient elevation related to surgical stress     HTN  hold home losartan until POD#2  DC hydralazine    HLD  c/w home atorvastatin    CVA  resume ASA daily once completes BID dosing for VTE PPx    Mild dementia  can hold aricept until discharge

## 2020-12-09 NOTE — CONSULT NOTE ADULT - ASSESSMENT
The patient is a 80 year old female with a history of HTN, HL, CVA who is admitted s/p TKR.    Plan:  - Presyncope likely vasovagal in nature, in part contributed by dehydration and pain medication  - No significant bradyarrhythmias noted; intermittent sinus bradycardia and rare blocked PACs  - Currently asymptomatic  - Encourage oral hydration  - Minimize narcotics  - Discontinue hydralazine  - Continue losartan 100 mg daily  - PT

## 2020-12-09 NOTE — PHARMACOTHERAPY INTERVENTION NOTE - COMMENTS
Patient was newly started on donepezil 10mg Qday last month. 2 rapid responses for syncope and bradycardia. These are both side effects of donepezil. Will DC for now. Patient will be informed of plan and reasoning.
Admission medication reconciliation POD1
Transition of Care video discharge education - medication calendar given to patient.

## 2020-12-09 NOTE — DISCHARGE NOTE NURSING/CASE MANAGEMENT/SOCIAL WORK - PATIENT PORTAL LINK FT
You can access the FollowMyHealth Patient Portal offered by [facility] by registering at the following website: [email or web address]. By joining [facility]’s FollowMyHealth portal, you will also be able to view your health information using other applications (apps) compatible with our system.

## 2020-12-09 NOTE — PROVIDER CONTACT NOTE (OTHER) - SITUATION
Tele tech called 2 sec pause with P wave, occurred 2 times.  two strips available. Tele tech called 2 sec pauses with P wave, occurred 2 times.  two strips available.  144/82, 55hr

## 2020-12-09 NOTE — CONSULT NOTE ADULT - SUBJECTIVE AND OBJECTIVE BOX
History of Present Illness: The patient is a 80 year old female with a history of HTN, HL, CVA who is admitted s/p TKR. Last night she went to the bathroom to urinate. After finishing, she felt lightheaded, warm, diaphoretic. She put her head down and almost lost consciousness but did not. No palpitations, chest pain, shortness of breath. She was further hydrated. This morning she has no complaints. She underwent PT without dizziness.    Past Medical/Surgical History:  HTN, HL, CVA    Medications:  Home Medications:  acetaminophen 500 mg oral tablet: 2 tab(s) orally every 8 hours (08 Dec 2020 13:51)  Aricept 10 mg oral tablet: 1 tab(s) orally once a day (08 Dec 2020 07:22)  aspirin: 81 milligram(s) orally once a day.  Restart when twice daily dosing is complete (08 Dec 2020 14:30)  atorvastatin 20 mg oral tablet: 1 tab(s) orally once a day (at bedtime) (08 Dec 2020 07:22)  Calcium 500+D: 750 milligram(s) orally once a day (08 Dec 2020 07:22)  CoQ10: 200 milligram(s) orally once a day (08 Dec 2020 07:22)  ginko: 120 milligram(s) orally once a day (08 Dec 2020 07:22)  losartan 100 mg oral tablet: 1 tab(s) orally once a day (at bedtime) (08 Dec 2020 07:22)  magnesium oxide 400 mg (241.3 mg elemental magnesium) oral tablet: 1 tab(s) orally once a day (08 Dec 2020 07:22)  Multiple Vitamins oral tablet: 1 tab(s) orally once a day (08 Dec 2020 07:22)  polyethylene glycol 3350 oral powder for reconstitution: 17 gram(s) orally once a day (08 Dec 2020 13:51)  Refresh ophthalmic solution: 1 drop(s) to each affected eye 2 times a day (08 Dec 2020 07:22)  senna oral tablet: 2 tab(s) orally once a day (at bedtime), As needed, Constipation (08 Dec 2020 13:51)  Vitamin B Complex 100: 1 tab(s) orally once a day (08 Dec 2020 07:22)  Vitamin D3 1000 intl units (25 mcg) oral tablet: 1 tab(s) orally once a day (08 Dec 2020 07:22)  vitamin E 400 intl units oral capsule: 1 cap(s) orally once a day (08 Dec 2020 07:22)      Family History: Non-contributory family history of premature cardiovascular atherosclerotic disease    Social History: No tobacco, alcohol or drug use    Review of Systems:  General: No fevers, chills, weight loss or gain  Skin: No rashes, color changes  Cardiovascular: No chest pain, orthopnea  Respiratory: No shortness of breath, cough  Gastrointestinal: No nausea, abdominal pain  Genitourinary: No incontinence, pain with urination  Musculoskeletal: No pain, swelling, decreased range of motion  Neurological: No headache, weakness  Psychiatric: No depression, anxiety  Endocrine: No weight loss or gain, increased thirst  All other systems are comprehensively negative.    Physical Exam:  Vitals:        Vital Signs Last 24 Hrs  T(C): 36.7 (09 Dec 2020 07:37), Max: 37.1 (08 Dec 2020 10:45)  T(F): 98.1 (09 Dec 2020 07:37), Max: 98.7 (08 Dec 2020 10:45)  HR: 72 (09 Dec 2020 09:15) (42 - 77)  BP: 158/58 (09 Dec 2020 07:37) (115/61 - 169/80)  BP(mean): --  RR: 20 (09 Dec 2020 07:37) (9 - 22)  SpO2: 99% (09 Dec 2020 09:15) (96% - 100%)  General: NAD  HEENT: MMM  Neck: No JVD, no carotid bruit  Lungs: CTAB  CV: RRR, nl S1/S2, no M/R/G  Abdomen: S/NT/ND, +BS  Extremities: No LE edema, no cyanosis  Neuro: AAOx3, non-focal  Skin: No rash    Labs:                        9.5    13.04 )-----------( 145      ( 09 Dec 2020 07:41 )             29.0     12-09    141  |  108  |  13  ----------------------------<  120<H>  4.4   |  24  |  1.08    Ca    9.0      09 Dec 2020 07:41  Phos  2.9     12-08  Mg     1.4     12-08    TPro  x   /  Alb  x   /  TBili  0.3  /  DBili  x   /  AST  x   /  ALT  x   /  AlkPhos  x   12-09    CARDIAC MARKERS ( 09 Dec 2020 07:41 )  .005 ng/mL / x     / x     / x     / x      CARDIAC MARKERS ( 08 Dec 2020 22:53 )  .008 ng/mL / x     / 259 U/L / x     / 1.7 ng/mL      PT/INR - ( 09 Dec 2020 07:41 )   PT: 12.8 sec;   INR: 1.06 ratio             Telemetry: Sinus rhythm, intermittent blocked PACs

## 2020-12-09 NOTE — PROGRESS NOTE ADULT - SUBJECTIVE AND OBJECTIVE BOX
Discharge medication calendar:  (ASA 81mg Qday preop)  Aspirin EC 81mg q12h x 6 weeks  APAP 1000mg q8h x 2-3 weeks  Celecoxib 100mg q12h x 2-3 weeks  Omeprazole 20mg QAM x 6 weeks  Narcotic PRN  Docusate 100mg TID while taking narcotic  Miralax, Senna, or Bisacodyl PRN for treatment of constipation

## 2020-12-09 NOTE — PROGRESS NOTE ADULT - SUBJECTIVE AND OBJECTIVE BOX
INTERVAL HPI/OVERNIGHT EVENTS:   Patient seen and examined.  Overnight events noted, RRT for syncope while on toilet, pauses on tele after blocked PACs.  No fevers, chills, sweats, dizziness, HA, changes in vision, cp, palpitations, sob, persistent cough, n/v/d, abd pain, dysuria, focal weakness, or calf pain.   Had syncope in 2016 when she had CVA (s/p tpa with no residual deficits), no other hx of syncope.      REVIEW OF SYSTEMS:  See HPI,  all others negative    PHYSICAL EXAM:  Vital Signs Last 24 Hrs  T(C): 36.7 (09 Dec 2020 07:37), Max: 37.1 (08 Dec 2020 10:45)  T(F): 98.1 (09 Dec 2020 07:37), Max: 98.7 (08 Dec 2020 10:45)  HR: 65 (09 Dec 2020 07:37) (42 - 77)  BP: 158/58 (09 Dec 2020 07:37) (115/61 - 169/80)  BP(mean): --  RR: 20 (09 Dec 2020 07:37) (9 - 22)  SpO2: 96% (09 Dec 2020 07:37) (96% - 100%)    GENERAL: NAD, well-groomed, well-developed, awake, alert, oriented x 3, fluent and coherent speech, thin build, sitting up in chair  EYES: EOMI, PERRLA, conjunctiva and sclera clear  ENMT: No tonsillar erythema, exudates, or enlargement; Moist mucous membranes, No lesions seen on oral mucosa  NECK: Supple, No JVD, No Cervical LAD, No thyromegaly, No thyroid nodules felt  NERVOUS SYSTEM:  Good concentration; Moving all 4 extremities against gravity and resistance; No gross sensory deficits, No facial droop  CHEST/LUNG: Clear to auscultation bilaterally with good air entry; No rales, rhonchi, wheezing, or rubs  HEART: Regular rate and rhythm; No murmurs, rubs, or gallops  ABDOMEN: Soft, Nontender, Nondistended, Bowel sounds present, No palpable masses or organomegaly, No bruits  EXTREMITIES:  2+ Peripheral Pulses, No clubbing, cyanosis, or edema, no calf tenderness in either leg  WOUND: dressing c/d/i    Diagnostic Testin.5    13.04 )-----------( 145      ( 09 Dec 2020 07:41 )             29.0     09 Dec 2020 07:41    141    |  108    |  13     ----------------------------<  120    4.4     |  24     |  1.08     Ca    9.0        09 Dec 2020 07:41  Phos  2.9       08 Dec 2020 22:53  Mg     1.4       08 Dec 2020 22:53    TPro  x      /  Alb  x      /  TBili  0.3    /  DBili  x      /  AST  x      /  ALT  x      /  AlkPhos  x      09 Dec 2020 07:41    PT/INR - ( 09 Dec 2020 07:41 )   PT: 12.8 sec;   INR: 1.06 ratio

## 2020-12-09 NOTE — CONSULT NOTE ADULT - PROBLEM SELECTOR RECOMMENDATION 9
80 year old female with a history of HTN, HL, CVA who is admitted s/p TKR.  near syncope episode eval noted  pt is on room air  hypoxemia resolved  cardio eval noted  pt is on ASA BID for dvt p  CE trend noted and reviewed  caution with Opioids  Bowel regimen  med reconciliation noted and reviewed

## 2020-12-09 NOTE — DISCHARGE NOTE NURSING/CASE MANAGEMENT/SOCIAL WORK - NSDCFUADDAPPT_GEN_ALL_CORE_FT
call Dr Lopez's office for follow up in 2 weeks.  It is advisable to follow up w/ your pcp in 2-3 weeks to be sure there are no underlying problems

## 2020-12-09 NOTE — PROGRESS NOTE ADULT - ASSESSMENT
79 yo F with hx of  HTN, CVA (no residual defects), mild dementia., HLD, Female admitted to [facility] for aftercare following Left total knee arthoplasty    POD#1 s/p LEFT TKR  - Pain control  - Bowel regimen  - PT/OT  - Incentive spirometry  - VTE PPx - ASA BID    RRT for syncope early morning and for dizziness now  - likely vasovagal and/or related to opioids  - H/H stable over last 24 hours  - DC oxycodone, patient wants to stick to tylenol, will add tramadol PRN  - has ambulated to bathroom without dizziness/syncope today  - blocked PACs on tele with pauses. evaluated by cardiology.    Hyperglycemia  - patient denies any hx of prediabetes/diabetes  - check A1c  - may be transient elevation related to surgical stress     HTN  hold home losartan until POD#2  DC hydralazine    HLD  c/w home atorvastatin    CVA  resume ASA daily once completes BID dosing for VTE PPx    Plan of care was discuss with patient, allquestions were answered, seems understand and in agreement.    Critical Time spent -  Discuss with PMD- spent 30 minutes  Time spent -  Discuss with PMD- spent 60 minutes.  case d/w cardiology and pulmonry .

## 2020-12-09 NOTE — PROGRESS NOTE ADULT - SUBJECTIVE AND OBJECTIVE BOX
Post Op     NAZ VINSON      80y        Female                                                                                                                 T(C): 36.4 (12-09-20 @ 03:10), Max: 37.1 (12-08-20 @ 10:45)  HR: 50 (12-09-20 @ 06:18) (42 - 77)  BP: 158/77 (12-09-20 @ 06:18) (115/61 - 178/70)  RR: 14 (12-09-20 @ 03:10) (9 - 22)  SpO2: 100% (12-09-20 @ 03:10) (96% - 100%)  Wt(kg): --    S/P   total knee replacement  left POD #1    Patient denies shortness of breath, chest pain, dyspnea, No complaints  Pain is 3 /10    Physical Exam    Extremity: Bilaterally:  No holmon                                           No Cord                                          PAS on                                          Neurovascular intact                                          Motor intact EHL/FHL                                          Sensation intact                                          Pulses intact DP/PT                                         Calves Soft                                         Dressing with blood , HV  out put 90 overnight . Drainage                                          Capillary refill with 5 seconds                          9.2    11.43 )-----------( 128      ( 08 Dec 2020 22:53 )             28.4       12-08    139  |  106  |  16  ----------------------------<  218<H>  4.1   |  22  |  1.24    Ca    8.4      08 Dec 2020 22:53  Phos  2.9     12-08  Mg     1.4     12-08        A/P  -- S/P total knee replacement     -  Medicine To Follow   - DVT prophylaxis PAS [id number] po bid  - PT & OT   - Analagesia  - Incentive Spirometry  - Discharge Planning  - Safety Precautions  -  CBC , BMP daily    Monitor  post op anemia   Syncopal  episode and rapid  response noted   - Cardiology to see  today   Tropin neg  x  1   Dr. rios note appreciated   Attending notified

## 2020-12-09 NOTE — DISCHARGE NOTE NURSING/CASE MANAGEMENT/SOCIAL WORK - NSSCNAMETXT_GEN_ALL_CORE
[facility] (formerly [facility])  [address] [place] [address]

## 2020-12-09 NOTE — CHART NOTE - NSCHARTNOTEFT_GEN_A_CORE
Called regarding 2 second pauses seen on telemetry.  Went to see telemetry and patient had about a 2 second pause at 0121 and 0130.  Each time there was a blocked PACs preceding the pause.  Otherwise rest of the telemetry strip was normal sinus.  EKG was done and showed normal sinus with no block.  Patient's HR was bradycardic between 48-55.  BP was within parameters.  Patient was resting at that time with no acute complaints.  Will continue to monitor and consult cardiology in the AM.  Of note, labs done after the RRT showed anemia and hypomagensium.  Anemia likely from blood loss from surgery and fluid hydration.  No indication for CT currently.  Will repeat in the AM to trend.  Will also replete magnesium as well.  Troponin neg x1, repeat in AM.

## 2020-12-09 NOTE — CONSULT NOTE ADULT - SUBJECTIVE AND OBJECTIVE BOX
Date/Time Patient Seen:  		  Referring MD:   Data Reviewed	       Patient is a 80y old  Female who presents with a chief complaint of left knee pain (09 Dec 2020 09:15)      Subjective/HPI  in bed  seen and examined  RRT notes reviewed  labs reviewed  pt is alert  oriented  verbal  on room air  vs noted     81yo female patient with over a year history of progressively worsening left knee pain. She had arthroscopy in August, but pain is not improving. She has had Cortisone and gel injections, and PT without significant improvement. She rates the pain at 5-6/10. She is taking Aleve prn without significant pain relief. She was told that TKR is recommended.     PAST SURGICAL HISTORY:  Benign cyst of breast Excision of bilatral breast cyst     History of cataract surgery bilateral 2018    History of lithotripsy [facility]     S/P arthroscopy of left knee 2020    S/P ORIF (open reduction internal fixation) fracture right wrist .     FAMILY HISTORY:  FH: HTN (hypertension), mother  Maternal family history of dementia, mother- .     Social History:  · Marital Status	  · Occupation	Retired nurse  · Lives With	alone     Substance Use History:  · Substance Use	never used     Alcohol Use History:  · Have you ever consumed alcohol	never     Tobacco Usage:  · Tobacco Usage: Never smoker     Passive Smoke Exposure:  · Passive Smoke Exposure	No    Presurgical Screening:    Cardiovascular:  · Activity	limited due to pain- walks around block daily,  · Energy expenditure (mets)	Mets approx 4  · Symptoms	none     Cardiac Tests:  · Last Echocardiogram	denies  · Last Stress Test	denies  · Last Cardiac Angiogram/Imaging	no     Sleep Apnea Screening:  Confirmed Obstructive Sleep Apnea (HUGO)?: No. HUGO screening performed.  STOP BANG Legend: 0-2 = LOW Risk; 3-4 = INTERMEDIATE Risk; 5-8 = HIGH Risk  S - Do you SNORE loudly (louder than talking or loud enough to be heard through closed doors): No  T - Are you TIRED, fatigued, or sleepy during the daytime: No  O - Has anyone OBSERVED you stop breathing during your sleep: No  P - Do you have or are you being treated for high blood PRESSURE: Yes  B - BMI greater than 35 kG/m2: No  A - AGE over 50 years old: Yes  G - GENDER male: No     Airway:  · Mallampati Score	Class II - visualization of the soft palate, fauces, and uvula  · Dentition	nothing loose or removable     Anesthesia History:  · Previous Reaction to Anesthesia	none; no known family hx     Corneal Abrasion Risk:  · Corneal Abrasion Risk	daily eye drops     Transfusion History:  · Blood Avoidance/Restrictions	none  · Previous Blood Transfusion	no    Core Measures/Disease Management:    Heart Failure:  Does this patient have a history of or has been diagnosed with heart failure? no.  PAST MEDICAL & SURGICAL HISTORY:  Dry eye syndrome    Osteoarthritis    Hyperlipidemia    History of short term memory loss  started on Aricept for early onset dementia  (2020)    Kidney stones  s/p lithotripsy    Cerebrovascular accident (CVA)  2016 - tx with TPA - no residual    HTN (hypertension)    S/P arthroscopy of left knee  2020    Benign cyst of breast  Excision of bilatral breast cyst &#x27;s    History of lithotripsy  [facility]     History of cataract surgery  bilateral 2018    S/P ORIF (open reduction internal fixation) fracture  right wrist           Medication list         MEDICATIONS  (STANDING):  acetaminophen   Tablet .. 1000 milliGRAM(s) Oral every 8 hours  artificial  tears Solution 1 Drop(s) Both EYES two times a day  aspirin enteric coated 81 milliGRAM(s) Oral every 12 hours  atorvastatin 20 milliGRAM(s) Oral at bedtime  celecoxib 100 milliGRAM(s) Oral every 12 hours  lactated ringers. 1000 milliLiter(s) (100 mL/Hr) IV Continuous <Continuous>  magnesium oxide 400 milliGRAM(s) Oral daily  pantoprazole    Tablet 40 milliGRAM(s) Oral before breakfast  polyethylene glycol 3350 17 Gram(s) Oral daily    MEDICATIONS  (PRN):  aluminum hydroxide/magnesium hydroxide/simethicone Suspension 30 milliLiter(s) Oral four times a day PRN Indigestion  HYDROmorphone  Injectable 0.5 milliGRAM(s) IV Push every 3 hours PRN breakthrough  magnesium hydroxide Suspension 30 milliLiter(s) Oral daily PRN Constipation  ondansetron Injectable 4 milliGRAM(s) IV Push every 6 hours PRN Nausea and/or Vomiting  senna 2 Tablet(s) Oral at bedtime PRN Constipation  traMADol 25 milliGRAM(s) Oral every 4 hours PRN Moderate Pain (4 - 6)  traMADol 50 milliGRAM(s) Oral every 4 hours PRN Severe Pain (7 - 10)         Vitals log        ICU Vital Signs Last 24 Hrs  T(C): 36.2 (09 Dec 2020 13:45), Max: 36.9 (08 Dec 2020 22:20)  T(F): 97.1 (09 Dec 2020 13:45), Max: 98.4 (08 Dec 2020 22:20)  HR: 42 (09 Dec 2020 13:45) (42 - 83)  BP: 133/55 (09 Dec 2020 13:45) (115/61 - 165/78)  BP(mean): --  ABP: --  ABP(mean): --  RR: 15 (09 Dec 2020 13:45) (14 - 20)  SpO2: 99% (09 Dec 2020 13:45) (96% - 100%)           Input and Output:  I&O's Detail    08 Dec 2020 07:01  -  09 Dec 2020 07:00  --------------------------------------------------------  IN:    IV PiggyBack: 450 mL    Lactated Ringers: 250 mL    Lactated Ringers: 1500 mL    Modular Fluid: 1500 mL    Oral Fluid: 920 mL    Sodium Chloride 0.9% Bolus: 500 mL  Total IN: 5120 mL    OUT:    Accordian (mL): 195 mL    Voided (mL): 1350 mL  Total OUT: 1545 mL    Total NET: 3575 mL          Lab Data                        9.5    13.04 )-----------( 145      ( 09 Dec 2020 07:41 )             29.0     12-09    141  |  108  |  13  ----------------------------<  120<H>  4.4   |  24  |  1.08    Ca    9.0      09 Dec 2020 07:41  Phos  2.9     12-08  Mg     1.4     12-08    TPro  x   /  Alb  x   /  TBili  0.3  /  DBili  x   /  AST  x   /  ALT  x   /  AlkPhos  x   12-09      CARDIAC MARKERS ( 09 Dec 2020 07:41 )  .005 ng/mL / x     / x     / x     / x      CARDIAC MARKERS ( 08 Dec 2020 22:53 )  .008 ng/mL / x     / 259 U/L / x     / 1.7 ng/mL        Review of Systems	  dizziness -     Objective     Physical Examination    on room air  head nc  head at  lung dec BS  abd soft  cn grossly int  verbal  alert    Pertinent Lab findings & Imaging      [place]:  NO   Adequate UO     I&O's Detail    08 Dec 2020 07:01  -  09 Dec 2020 07:00  --------------------------------------------------------  IN:    IV PiggyBack: 450 mL    Lactated Ringers: 250 mL    Lactated Ringers: 1500 mL    Modular Fluid: 1500 mL    Oral Fluid: 920 mL    Sodium Chloride 0.9% Bolus: 500 mL  Total IN: 5120 mL    OUT:    Accordian (mL): 195 mL    Voided (mL): 1350 mL  Total OUT: 1545 mL    Total NET: 3575 mL               Discussed with:     Cultures:	        Radiology    EXAM:  KNEE AP LAT & OBL LEFT                                  PROCEDURE DATE:  2020          INTERPRETATION:  Left knee.    Postoperative AP and lateral views show a knee replacement with good alignment. No bone destruction or fracture.    IMPRESSION: Left knee replacement.              ROGERS FLORES M.D., ATTENDING RADIOLOGIST  This document has been electronically signed. Dec  8 2020  2:10PM

## 2020-12-09 NOTE — PROGRESS NOTE ADULT - SUBJECTIVE AND OBJECTIVE BOX
Patient is a 80y old  Female who presents with a chief complaint of left knee pain (09 Dec 2020 09:15)      Rapid response was called on [id number] patient for  dizziness while walking with PT. Monitor showed bradycardia.    Patient was seen and examined at the bedside by the rapid response team.  Feels better after lying on bed.    PAST MEDICAL & SURGICAL HISTORY:  Dry eye syndrome    Osteoarthritis    Hyperlipidemia    History of short term memory loss  started on Aricept for early onset dementia  (11/2020)    Kidney stones  s/p lithotripsy    Cerebrovascular accident (CVA)  2016 - tx with TPA - no residual    HTN (hypertension)    S/P arthroscopy of left knee  8/2020    Benign cyst of breast  Excision of bilatral breast cyst 1970&#x27;s    History of lithotripsy  [facility] June,2020    History of cataract surgery  bilateral 2018    S/P ORIF (open reduction internal fixation) fracture  right wrist 1993        MEDICATIONS  (STANDING):  acetaminophen   Tablet .. 1000 milliGRAM(s) Oral every 8 hours  artificial  tears Solution 1 Drop(s) Both EYES two times a day  aspirin enteric coated 81 milliGRAM(s) Oral every 12 hours  atorvastatin 20 milliGRAM(s) Oral at bedtime  celecoxib 100 milliGRAM(s) Oral every 12 hours  donepezil 10 milliGRAM(s) Oral daily  lactated ringers. 1000 milliLiter(s) (100 mL/Hr) IV Continuous <Continuous>  magnesium oxide 400 milliGRAM(s) Oral daily  pantoprazole    Tablet 40 milliGRAM(s) Oral before breakfast  polyethylene glycol 3350 17 Gram(s) Oral daily    MEDICATIONS  (PRN):  aluminum hydroxide/magnesium hydroxide/simethicone Suspension 30 milliLiter(s) Oral four times a day PRN Indigestion  HYDROmorphone  Injectable 0.5 milliGRAM(s) IV Push every 3 hours PRN breakthrough  magnesium hydroxide Suspension 30 milliLiter(s) Oral daily PRN Constipation  ondansetron Injectable 4 milliGRAM(s) IV Push every 6 hours PRN Nausea and/or Vomiting  senna 2 Tablet(s) Oral at bedtime PRN Constipation  traMADol 25 milliGRAM(s) Oral every 4 hours PRN Moderate Pain (4 - 6)  traMADol 50 milliGRAM(s) Oral every 4 hours PRN Severe Pain (7 - 10)      Allergies    No Known Allergies    Intolerances        Vital Signs Last 24 Hrs  T(C): 36.2 (09 Dec 2020 13:45), Max: 36.9 (08 Dec 2020 15:00)  T(F): 97.1 (09 Dec 2020 13:45), Max: 98.5 (08 Dec 2020 15:00)  HR: 42 (09 Dec 2020 13:45) (42 - 83)  BP: 133/55 (09 Dec 2020 13:45) (115/61 - 169/80)  BP(mean): --  RR: 15 (09 Dec 2020 13:45) (14 - 20)  SpO2: 99% (09 Dec 2020 13:45) (96% - 100%)    PHYSICAL EXAM:  GENERAL: NAD, well-groomed, well-developed  HEAD:  Atraumatic, Normocephalic  EYES: EOMI, PERRLA, conjunctiva and sclera clear  ENMT: No tonsillar erythema, exudates, or enlargement; Moist mucous membranes, Good dentition, No lesions  NECK: Supple, No JVD, Normal thyroid  NERVOUS SYSTEM:  Alert & Oriented X3, Good concentration; Motor Strength 5/5 B/L upper and lower extremities; DTRs 2+ intact and symmetric  CHEST/LUNG: Clear to auscultation bilaterally; No rales, rhonchi, wheezing, or rubs  HEART: Regular rate and rhythm; No murmurs, rubs, or gallops  ABDOMEN: Soft, Nontender, Nondistended; Bowel sounds present  EXTREMITIES:  2+ Peripheral Pulses, No clubbing, cyanosis, or edema  LYMPH: No lymphadenopathy noted  SKIN: No rashes or lesions    LABS:                        9.5    13.04 )-----------( 145      ( 09 Dec 2020 07:41 )             29.0     09 Dec 2020 07:41    141    |  108    |  13     ----------------------------<  120    4.4     |  24     |  1.08     Ca    9.0        09 Dec 2020 07:41  Phos  2.9       08 Dec 2020 22:53  Mg     1.4       08 Dec 2020 22:53    TPro  x      /  Alb  x      /  TBili  0.3    /  DBili  x      /  AST  x      /  ALT  x      /  AlkPhos  x      09 Dec 2020 07:41    PT/INR - ( 09 Dec 2020 07:41 )   PT: 12.8 sec;   INR: 1.06 ratio             CAPILLARY BLOOD GLUCOSE      POCT Blood Glucose.: 130 mg/dL (09 Dec 2020 13:48)  POCT Blood Glucose.: 104 mg/dL (09 Dec 2020 07:57)  POCT Blood Glucose.: 198 mg/dL (08 Dec 2020 22:24)        RADIOLOGY & ADDITIONAL TESTS:    Imaging Personally Reviewed:  [ ] YES  [ ] NO    Consultant(s) Notes Reviewed:  [ ] YES  [ ] NO    Care Discussed with Consultants/Other Providers [ ] YES  [ ] NO

## 2020-12-10 LAB
ANION GAP SERPL CALC-SCNC: 6 MMOL/L — SIGNIFICANT CHANGE UP (ref 5–17)
BUN SERPL-MCNC: 21 MG/DL — SIGNIFICANT CHANGE UP (ref 7–23)
CALCIUM SERPL-MCNC: 9.1 MG/DL — SIGNIFICANT CHANGE UP (ref 8.4–10.5)
CHLORIDE SERPL-SCNC: 111 MMOL/L — HIGH (ref 96–108)
CO2 SERPL-SCNC: 27 MMOL/L — SIGNIFICANT CHANGE UP (ref 22–31)
CREAT SERPL-MCNC: 1.17 MG/DL — SIGNIFICANT CHANGE UP (ref 0.5–1.3)
GLUCOSE SERPL-MCNC: 90 MG/DL — SIGNIFICANT CHANGE UP (ref 70–99)
HCT VFR BLD CALC: 24.2 % — LOW (ref 34.5–45)
HCT VFR BLD CALC: 24.5 % — LOW (ref 34.5–45)
HGB BLD-MCNC: 7.9 G/DL — LOW (ref 11.5–15.5)
HGB BLD-MCNC: 8 G/DL — LOW (ref 11.5–15.5)
MCHC RBC-ENTMCNC: 27.1 PG — SIGNIFICANT CHANGE UP (ref 27–34)
MCHC RBC-ENTMCNC: 27.4 PG — SIGNIFICANT CHANGE UP (ref 27–34)
MCHC RBC-ENTMCNC: 32.6 GM/DL — SIGNIFICANT CHANGE UP (ref 32–36)
MCHC RBC-ENTMCNC: 32.7 GM/DL — SIGNIFICANT CHANGE UP (ref 32–36)
MCV RBC AUTO: 83.1 FL — SIGNIFICANT CHANGE UP (ref 80–100)
MCV RBC AUTO: 84 FL — SIGNIFICANT CHANGE UP (ref 80–100)
NRBC # BLD: 0 /100 WBCS — SIGNIFICANT CHANGE UP (ref 0–0)
NRBC # BLD: 0 /100 WBCS — SIGNIFICANT CHANGE UP (ref 0–0)
PLATELET # BLD AUTO: 111 K/UL — LOW (ref 150–400)
PLATELET # BLD AUTO: 135 K/UL — LOW (ref 150–400)
POTASSIUM SERPL-MCNC: 4.8 MMOL/L — SIGNIFICANT CHANGE UP (ref 3.5–5.3)
POTASSIUM SERPL-SCNC: 4.8 MMOL/L — SIGNIFICANT CHANGE UP (ref 3.5–5.3)
RBC # BLD: 2.88 M/UL — LOW (ref 3.8–5.2)
RBC # BLD: 2.95 M/UL — LOW (ref 3.8–5.2)
RBC # FLD: 12.5 % — SIGNIFICANT CHANGE UP (ref 10.3–14.5)
RBC # FLD: 12.7 % — SIGNIFICANT CHANGE UP (ref 10.3–14.5)
SODIUM SERPL-SCNC: 144 MMOL/L — SIGNIFICANT CHANGE UP (ref 135–145)
WBC # BLD: 6.86 K/UL — SIGNIFICANT CHANGE UP (ref 3.8–10.5)
WBC # BLD: 6.88 K/UL — SIGNIFICANT CHANGE UP (ref 3.8–10.5)
WBC # FLD AUTO: 6.86 K/UL — SIGNIFICANT CHANGE UP (ref 3.8–10.5)
WBC # FLD AUTO: 6.88 K/UL — SIGNIFICANT CHANGE UP (ref 3.8–10.5)

## 2020-12-10 PROCEDURE — 99233 SBSQ HOSP IP/OBS HIGH 50: CPT

## 2020-12-10 RX ORDER — HYDRALAZINE HCL 50 MG
25 TABLET ORAL EVERY 6 HOURS
Refills: 0 | Status: DISCONTINUED | OUTPATIENT
Start: 2020-12-10 | End: 2020-12-11

## 2020-12-10 RX ORDER — DONEPEZIL HYDROCHLORIDE 10 MG/1
1 TABLET, FILM COATED ORAL
Qty: 0 | Refills: 0 | DISCHARGE

## 2020-12-10 RX ORDER — TRAMADOL HYDROCHLORIDE 50 MG/1
0.5 TABLET ORAL
Qty: 21 | Refills: 0
Start: 2020-12-10 | End: 2020-12-16

## 2020-12-10 RX ADMIN — Medication 1 DROP(S): at 18:26

## 2020-12-10 RX ADMIN — CELECOXIB 100 MILLIGRAM(S): 200 CAPSULE ORAL at 09:22

## 2020-12-10 RX ADMIN — Medication 1000 MILLIGRAM(S): at 12:02

## 2020-12-10 RX ADMIN — LOSARTAN POTASSIUM 100 MILLIGRAM(S): 100 TABLET, FILM COATED ORAL at 05:48

## 2020-12-10 RX ADMIN — Medication 1000 MILLIGRAM(S): at 21:02

## 2020-12-10 RX ADMIN — CELECOXIB 100 MILLIGRAM(S): 200 CAPSULE ORAL at 21:35

## 2020-12-10 RX ADMIN — PANTOPRAZOLE SODIUM 40 MILLIGRAM(S): 20 TABLET, DELAYED RELEASE ORAL at 05:50

## 2020-12-10 RX ADMIN — MAGNESIUM OXIDE 400 MG ORAL TABLET 400 MILLIGRAM(S): 241.3 TABLET ORAL at 12:02

## 2020-12-10 RX ADMIN — CELECOXIB 100 MILLIGRAM(S): 200 CAPSULE ORAL at 08:52

## 2020-12-10 RX ADMIN — TRAMADOL HYDROCHLORIDE 25 MILLIGRAM(S): 50 TABLET ORAL at 09:24

## 2020-12-10 RX ADMIN — CELECOXIB 100 MILLIGRAM(S): 200 CAPSULE ORAL at 21:02

## 2020-12-10 RX ADMIN — Medication 1000 MILLIGRAM(S): at 21:35

## 2020-12-10 RX ADMIN — Medication 81 MILLIGRAM(S): at 05:48

## 2020-12-10 RX ADMIN — Medication 1 DROP(S): at 05:50

## 2020-12-10 RX ADMIN — ATORVASTATIN CALCIUM 20 MILLIGRAM(S): 80 TABLET, FILM COATED ORAL at 21:02

## 2020-12-10 RX ADMIN — Medication 81 MILLIGRAM(S): at 18:26

## 2020-12-10 RX ADMIN — Medication 1000 MILLIGRAM(S): at 12:32

## 2020-12-10 RX ADMIN — TRAMADOL HYDROCHLORIDE 25 MILLIGRAM(S): 50 TABLET ORAL at 08:54

## 2020-12-10 NOTE — PROGRESS NOTE ADULT - SUBJECTIVE AND OBJECTIVE BOX
Chief Complaint: TKR    Interval Events: Another recurrent episode of syncope yesterday    Review of Systems:  General: No fevers, chills, weight loss or gain  Skin: No rashes, color changes  Cardiovascular: No chest pain, orthopnea  Respiratory: No shortness of breath, cough  Gastrointestinal: No nausea, abdominal pain  Genitourinary: No incontinence, pain with urination  Musculoskeletal: No pain, swelling, decreased range of motion  Neurological: No headache, weakness  Psychiatric: No depression, anxiety  Endocrine: No weight loss or gain, increased thirst  All other systems are comprehensively negative.    Physical Exam:  Vital Signs Last 24 Hrs  T(C): 36.9 (10 Dec 2020 07:46), Max: 36.9 (09 Dec 2020 19:41)  T(F): 98.4 (10 Dec 2020 07:46), Max: 98.5 (09 Dec 2020 19:41)  HR: 69 (10 Dec 2020 07:46) (42 - 91)  BP: 184/75 (10 Dec 2020 07:47) (120/82 - 185/74)  BP(mean): --  RR: 18 (10 Dec 2020 07:46) (15 - 18)  SpO2: 98% (10 Dec 2020 07:46) (97% - 99%)  General: NAD  HEENT: MMM  Neck: No JVD, no carotid bruit  Lungs: CTAB  CV: RRR, nl S1/S2, no M/R/G  Abdomen: S/NT/ND, +BS  Extremities: No LE edema, no cyanosis  Neuro: AAOx3, non-focal  Skin: No rash    Labs:                        8.0    6.86  )-----------( 111      ( 10 Dec 2020 06:48 )             24.5     12-10    144  |  111<H>  |  21  ----------------------------<  90  4.8   |  27  |  1.17    Ca    9.1      10 Dec 2020 06:48  Phos  2.9     12-08  Mg     1.4     12-08    TPro  x   /  Alb  x   /  TBili  0.3  /  DBili  x   /  AST  x   /  ALT  x   /  AlkPhos  x   12-09    CARDIAC MARKERS ( 09 Dec 2020 07:41 )  .005 ng/mL / x     / x     / x     / x      CARDIAC MARKERS ( 08 Dec 2020 22:53 )  .008 ng/mL / x     / 259 U/L / x     / 1.7 ng/mL      PT/INR - ( 09 Dec 2020 07:41 )   PT: 12.8 sec;   INR: 1.06 ratio             Telemetry: Sinus rhythm, intermittent bradycardia, blocked PACs

## 2020-12-10 NOTE — PROGRESS NOTE ADULT - SUBJECTIVE AND OBJECTIVE BOX
Date/Time Patient Seen:  		  Referring MD:   Data Reviewed	       Patient is a 80y old  Female who presents with a chief complaint of Patient is a 80y old  Female who presents with a chief complaint of left knee pain-- for left TKA (10 Dec 2020 07:30) (10 Dec 2020 09:11)      Subjective/HPI     PAST MEDICAL & SURGICAL HISTORY:  Dry eye syndrome    Osteoarthritis    Hyperlipidemia    History of short term memory loss  started on Aricept for early onset dementia  (11/2020)    Kidney stones  s/p lithotripsy    Cerebrovascular accident (CVA)  2016 - tx with TPA - no residual    HTN (hypertension)    S/P arthroscopy of left knee  8/2020    Benign cyst of breast  Excision of bilatral breast cyst 1970&#x27;s    History of lithotripsy  [facility] June,2020    History of cataract surgery  bilateral 2018    S/P ORIF (open reduction internal fixation) fracture  right wrist 1993          Medication list         MEDICATIONS  (STANDING):  acetaminophen   Tablet .. 1000 milliGRAM(s) Oral every 8 hours  artificial  tears Solution 1 Drop(s) Both EYES two times a day  aspirin enteric coated 81 milliGRAM(s) Oral every 12 hours  atorvastatin 20 milliGRAM(s) Oral at bedtime  celecoxib 100 milliGRAM(s) Oral every 12 hours  lactated ringers. 1000 milliLiter(s) (100 mL/Hr) IV Continuous <Continuous>  losartan 100 milliGRAM(s) Oral daily  magnesium oxide 400 milliGRAM(s) Oral daily  pantoprazole    Tablet 40 milliGRAM(s) Oral before breakfast  polyethylene glycol 3350 17 Gram(s) Oral daily    MEDICATIONS  (PRN):  aluminum hydroxide/magnesium hydroxide/simethicone Suspension 30 milliLiter(s) Oral four times a day PRN Indigestion  bisacodyl Suppository 10 milliGRAM(s) Rectal daily PRN If no bowel movement by postoperative day #2  hydrALAZINE 25 milliGRAM(s) Oral every 6 hours PRN SBP>190 or DBP>100  HYDROmorphone  Injectable 0.5 milliGRAM(s) IV Push every 3 hours PRN breakthrough  magnesium hydroxide Suspension 30 milliLiter(s) Oral daily PRN Constipation  ondansetron Injectable 4 milliGRAM(s) IV Push every 6 hours PRN Nausea and/or Vomiting  senna 2 Tablet(s) Oral at bedtime PRN Constipation  traMADol 25 milliGRAM(s) Oral every 4 hours PRN Moderate Pain (4 - 6)  traMADol 50 milliGRAM(s) Oral every 4 hours PRN Severe Pain (7 - 10)         Vitals log        ICU Vital Signs Last 24 Hrs  T(C): 36.9 (10 Dec 2020 07:46), Max: 36.9 (09 Dec 2020 19:41)  T(F): 98.4 (10 Dec 2020 07:46), Max: 98.5 (09 Dec 2020 19:41)  HR: 69 (10 Dec 2020 07:46) (42 - 91)  BP: 184/75 (10 Dec 2020 07:47) (120/82 - 185/74)  BP(mean): --  ABP: --  ABP(mean): --  RR: 18 (10 Dec 2020 07:46) (15 - 18)  SpO2: 98% (10 Dec 2020 07:46) (97% - 99%)           Input and Output:  I&O's Detail    09 Dec 2020 07:01  -  10 Dec 2020 07:00  --------------------------------------------------------  IN:    Oral Fluid: 200 mL  Total IN: 200 mL    OUT:  Total OUT: 0 mL    Total NET: 200 mL          Lab Data                        8.0    6.86  )-----------( 111      ( 10 Dec 2020 06:48 )             24.5     12-10    144  |  111<H>  |  21  ----------------------------<  90  4.8   |  27  |  1.17    Ca    9.1      10 Dec 2020 06:48  Phos  2.9     12-08  Mg     1.4     12-08    TPro  x   /  Alb  x   /  TBili  0.3  /  DBili  x   /  AST  x   /  ALT  x   /  AlkPhos  x   12-09      CARDIAC MARKERS ( 09 Dec 2020 07:41 )  .005 ng/mL / x     / x     / x     / x      CARDIAC MARKERS ( 08 Dec 2020 22:53 )  .008 ng/mL / x     / 259 U/L / x     / 1.7 ng/mL        Review of Systems	      Objective     Physical Examination    heart s1s2  lung dec BS  abd soft  head nc  on room air  verbal      Pertinent Lab findings & Imaging      [place]:  NO   Adequate UO     I&O's Detail    09 Dec 2020 07:01  -  10 Dec 2020 07:00  --------------------------------------------------------  IN:    Oral Fluid: 200 mL  Total IN: 200 mL    OUT:  Total OUT: 0 mL    Total NET: 200 mL               Discussed with:     Cultures:	        Radiology

## 2020-12-10 NOTE — PROGRESS NOTE ADULT - SUBJECTIVE AND OBJECTIVE BOX
Procedure: Left TKR  POD #: 2  S: Pt without complaints. No SOB,CP, N/V. Tolerated Diet well. Had 2 rapid responses called yesterday due to vasovagal and bradycardia.  Better today.  Has been up to bathroom w/o dizziness.  Aricept has been d/c'ed.  Pain comfortable on  Interval Rx. took 1 dose of tramadol 25  +BM + flatus, No abdominal pain.  Pain Rx:  acetaminophen   Tablet .. 1000 milliGRAM(s) Oral every 8 hours  celecoxib 100 milliGRAM(s) Oral every 12 hours  HYDROmorphone  Injectable 0.5 milliGRAM(s) IV Push every 3 hours PRN  ondansetron Injectable 4 milliGRAM(s) IV Push every 6 hours PRN  traMADol 25 milliGRAM(s) Oral every 4 hours PRN  traMADol 50 milliGRAM(s) Oral every 4 hours PRN    O: General: On exam, No Apparent Distress  Vital Signs Last 24 Hrs  T(C): 36.8 (10 Dec 2020 03:30), Max: 36.9 (09 Dec 2020 19:41)  T(F): 98.3 (10 Dec 2020 03:30), Max: 98.5 (09 Dec 2020 19:41)  HR: 67 (10 Dec 2020 05:47) (42 - 91)  BP: 159/84 (10 Dec 2020 05:47) (120/82 - 159/84)  BP(mean): --  RR: 16 (10 Dec 2020 03:30) (15 - 20)  SpO2: 97% (10 Dec 2020 03:30) (96% - 99%)    Lungs: BS clear bilat.  Heart: RRR no murmur  Abdomen: + BS, soft , benign exam     Left dressing changed.  some old bloody drainage on dressing at wound and hemovac site. Redressed w/ guaze, spandage and ace.  ROM: 0-80  Neurologic:  Has sensation over feet & toes bilat. Full AROM bilat feet & toes. EHL/AT = 5/5  Vascular: Feet toes warm, pink. DP = 2+. No calf tenderness bilat..  VTEP: On Bilat. Venodynes + aspirin enteric coated 81 milliGRAM(s) Oral every 12 hours    I&O's Detail    09 Dec 2020 07:01  -  10 Dec 2020 07:00  --------------------------------------------------------  IN:    Oral Fluid: 200 mL  Total IN: 200 mL    OUT:  Total OUT: 0 mL    Total NET: 200 mL          Activity in PT yesterday Noted. Walked 300' and 15 stairs  Labs yesterday noted.  Hospitalist input noted.  Labs Today:                         8.0    6.86  )-----------( 111      ( 10 Dec 2020 06:48 )             24.5       12-10    144  |  111<H>  |  21  ----------------------------<  90  4.8   |  27  |  1.17    Ca    9.1      10 Dec 2020 06:48  Phos  2.9     12-08  Mg     1.4     12-08    TPro  x   /  Alb  x   /  TBili  0.3  /  DBili  x   /  AST  x   /  ALT  x   /  AlkPhos  x   12-09      Primary Orthopedic Assessment:  • Stable from Orthopedic perspective  • Neuro motor exam stable  • Labs: CBC--post op anemia, tolerating now / Chem stable      Plan:   • Continue:  PT/OT/WBAT with assistance of a walker/Ice to knee/ Knee ROM         Incentive spirometry encouraged   • Continue DVT prophylaxis as prescribed, including use of compression devices and ankle pumps  • Continue Pain Rx  • Plans per Medicine /Cardiology  • Discharge planning – anticipated discharge is Home D/C with home care & home PT when medically stable & cleared by PT/OT  _ poss transfusion--will defer to medicine for this

## 2020-12-10 NOTE — PROGRESS NOTE ADULT - SUBJECTIVE AND OBJECTIVE BOX
INTERVAL HPI/OVERNIGHT EVENTS:   Patient seen and examined.  Had RRT again yesterday, feeling better today, was up on commode this morning w/o dizziness.  No fevers, chills, sweats, dizziness, HA, changes in vision, cp, palpitations, sob, persistent cough, n/v/d, abd pain, dysuria, focal weakness, or calf pain.   Had syncope in 2016 when she had CVA (s/p tpa with no residual deficits), no other hx of syncope.      REVIEW OF SYSTEMS:  See HPI,  all others negative    PHYSICAL EXAM:  Vital Signs Last 24 Hrs  T(C): 36.9 (10 Dec 2020 07:46), Max: 36.9 (09 Dec 2020 19:41)  T(F): 98.4 (10 Dec 2020 07:46), Max: 98.5 (09 Dec 2020 19:41)  HR: 69 (10 Dec 2020 07:46) (42 - 91)  BP: 184/75 (10 Dec 2020 07:47) (120/82 - 185/74)  BP(mean): --  RR: 18 (10 Dec 2020 07:46) (15 - 18)  SpO2: 98% (10 Dec 2020 07:46) (97% - 99%)    GENERAL: NAD, well-groomed, well-developed, awake, alert, oriented x 3, fluent and coherent speech, thin build, sitting up in chair  EYES: EOMI, PERRLA, conjunctiva and sclera clear  ENMT: No tonsillar erythema, exudates, or enlargement; Moist mucous membranes, No lesions seen on oral mucosa  NECK: Supple, No JVD, No Cervical LAD   NERVOUS SYSTEM:  Good concentration; Moving all 4 extremities against gravity and resistance; No gross sensory deficits, No facial droop  CHEST/LUNG: Clear to auscultation bilaterally with good air entry; No rales, rhonchi, wheezing, or rubs  HEART: Regular rate and rhythm; No murmurs, rubs, or gallops  ABDOMEN: Soft, Nontender, Nondistended, Bowel sounds present, No palpable masses or organomegaly, No bruits  EXTREMITIES:  2+ Peripheral Pulses, No clubbing, cyanosis, or edema, no calf tenderness in either leg  WOUND: dressing c/d/i, periwound erythema within expected limits    Diagnostic Testin.0    6.86  )-----------( 111      ( 10 Dec 2020 06:48 )             24.5     12-10    144  |  111<H>  |  21  ----------------------------<  90  4.8   |  27  |  1.17    Ca    9.1      10 Dec 2020 06:48  Phos  2.9     12  Mg     1.4         TPro  x   /  Alb  x   /  TBili  0.3  /  DBili  x   /  AST  x   /  ALT  x   /  AlkPhos  x

## 2020-12-10 NOTE — PROGRESS NOTE ADULT - SUBJECTIVE AND OBJECTIVE BOX
POST OPERATIVE DAY #:  [2 ]   STATUS POST: [ L TKR ]                       SUBJECTIVE: Patient seen and examined [  without complaints of pain and dizziness  ]     Pain (0-10): 1    OBJECTIVE:   Vital Signs Last 24 Hrs  T(C): 37 (10 Dec 2020 11:28), Max: 37 (10 Dec 2020 11:28)  T(F): 98.6 (10 Dec 2020 11:28), Max: 98.6 (10 Dec 2020 11:28)  HR: 76 (10 Dec 2020 11:28) (42 - 91)  BP: 149/67 (10 Dec 2020 11:28) (133/55 - 185/74)  RR: 18 (10 Dec 2020 11:28) (15 - 18)  SpO2: 100% (10 Dec 2020 11:28) (97% - 100%)\par Constitutional: Pleasant in no acute distress  Psych:A&Ox3      L Knee:          Dressing: [x ] clean/dry/intact  [ ] Other:           Sensation: [x ]          [ ] Upper extremity                ax        mc           m          u          r                                                         R          +           +             +           +          +                                               L           +           +             +           +          +         [x ] Lower extremity             sp         dp         saph       jennings         tibial                                                R          +           +             +           +          +                                               L           +           +             +           +          +                   Motor exam: [ x ]          [ ] Upper extremity              Bi(c5)  WE(c6)  EE(c7)   FF(c8)                                                R         5/5        5/5        5/5       5/5                                               L          5/5        5/5        5/5       5/5         [x ] Lower extremity          HF(l2)   KE(l3)    TA(l4)   EHL(l5)  GS(s1)                                                 R        5/5        5/5        5/5       5/5         5/5                                               L         5/5        5/5       5/5       5/5          5/5                                                        [x ] warm well perfused; capillary refill <3 seconds                                 LABS:                        8.0    6.86  )-----------( 111      ( 10 Dec 2020 06:48 )             24.5         RADIOLOGY & ADDITIONAL STUDIES: n/a      A/P :  80y Female S/P  L TKR        POD# 2  -    Pain control better on Tramadol  -    DVT ppx: [x ]SCDs[ x] Pharmacologic    -    ADAT  -    Check PM labs    -    Physical Therapy  -    Weight bearing status: WBAT [x]        PWB    [ ]     TTWB  [ ]      NWB  [ ]  -    Dispo: Home [x ]     Acute Rehab [ ]      JAIRON [ ]          case discussed with Daughter today at 11:55 am.  Pt progressing well.  Agree with plan to transfuse if Hb < 8 mg/dl.  Discharge planning.  Patient not symptomatic today.

## 2020-12-10 NOTE — PROGRESS NOTE ADULT - PROBLEM SELECTOR PLAN 1
80 year old female with a history of HTN, HL, CVA who is admitted s/p TKR.  near syncope episode eval noted, s/p RRT on 2 occasions on this admission  plan for CBC repeat today at noon - cm notes reviewed - dc planning -   pt is on room air  hypoxemia resolved  cardio eval noted  pt is on ASA BID for dvt p  CE trend noted and reviewed  caution with Opioids  Bowel regimen  med reconciliation noted and reviewed.

## 2020-12-10 NOTE — PROGRESS NOTE ADULT - ASSESSMENT
81 yo F with hx of  HTN, CVA (no residual defects), mild dementia., HLD, Female admitted to [facility] for aftercare following Left total knee arthoplasty    POD#2 s/p LEFT TKR  - Pain control  - Bowel regimen  - PT/OT  - Incentive spirometry  - VTE PPx - ASA BID    RRT for syncope x 2 (12/8,12/9)  - likely vasovagal and/or related to anemia  - DCed oxycodone, patient wants to stick to tylenol, will add tramadol PRN  - blocked PACs on tele with pauses. cardio eval appreciated  - continue IVF  - monitor    Post-op anemia due to acute blood loss  - repeat CBC at noon  - if Hgb <= 8, transfuse 1 units PRBCs given 2 syncopal events    Hyperglycemia  - patient denies any hx of prediabetes/diabetes  - A1c 5.6  - may be transient elevation related to surgical stress  - improved     HTN  continue home losartan on POD#2    HLD  c/w home atorvastatin    CVA  resume ASA daily once completes BID dosing for VTE PPx

## 2020-12-11 VITALS — HEART RATE: 90 BPM | OXYGEN SATURATION: 99 %

## 2020-12-11 LAB
ANION GAP SERPL CALC-SCNC: 4 MMOL/L — LOW (ref 5–17)
BUN SERPL-MCNC: 14 MG/DL — SIGNIFICANT CHANGE UP (ref 7–23)
CALCIUM SERPL-MCNC: 9.3 MG/DL — SIGNIFICANT CHANGE UP (ref 8.4–10.5)
CHLORIDE SERPL-SCNC: 109 MMOL/L — HIGH (ref 96–108)
CO2 SERPL-SCNC: 29 MMOL/L — SIGNIFICANT CHANGE UP (ref 22–31)
CREAT SERPL-MCNC: 0.85 MG/DL — SIGNIFICANT CHANGE UP (ref 0.5–1.3)
GLUCOSE SERPL-MCNC: 92 MG/DL — SIGNIFICANT CHANGE UP (ref 70–99)
HCT VFR BLD CALC: 26.6 % — LOW (ref 34.5–45)
HGB BLD-MCNC: 8.9 G/DL — LOW (ref 11.5–15.5)
MCHC RBC-ENTMCNC: 28 PG — SIGNIFICANT CHANGE UP (ref 27–34)
MCHC RBC-ENTMCNC: 33.5 GM/DL — SIGNIFICANT CHANGE UP (ref 32–36)
MCV RBC AUTO: 83.6 FL — SIGNIFICANT CHANGE UP (ref 80–100)
NRBC # BLD: 0 /100 WBCS — SIGNIFICANT CHANGE UP (ref 0–0)
PLATELET # BLD AUTO: 115 K/UL — LOW (ref 150–400)
POTASSIUM SERPL-MCNC: 4.5 MMOL/L — SIGNIFICANT CHANGE UP (ref 3.5–5.3)
POTASSIUM SERPL-SCNC: 4.5 MMOL/L — SIGNIFICANT CHANGE UP (ref 3.5–5.3)
RBC # BLD: 3.18 M/UL — LOW (ref 3.8–5.2)
RBC # FLD: 12.7 % — SIGNIFICANT CHANGE UP (ref 10.3–14.5)
SODIUM SERPL-SCNC: 142 MMOL/L — SIGNIFICANT CHANGE UP (ref 135–145)
WBC # BLD: 6.27 K/UL — SIGNIFICANT CHANGE UP (ref 3.8–10.5)
WBC # FLD AUTO: 6.27 K/UL — SIGNIFICANT CHANGE UP (ref 3.8–10.5)

## 2020-12-11 PROCEDURE — 12345: CPT | Mod: NC

## 2020-12-11 PROCEDURE — 99233 SBSQ HOSP IP/OBS HIGH 50: CPT

## 2020-12-11 RX ADMIN — MAGNESIUM OXIDE 400 MG ORAL TABLET 400 MILLIGRAM(S): 241.3 TABLET ORAL at 13:11

## 2020-12-11 RX ADMIN — LOSARTAN POTASSIUM 100 MILLIGRAM(S): 100 TABLET, FILM COATED ORAL at 05:44

## 2020-12-11 RX ADMIN — CELECOXIB 100 MILLIGRAM(S): 200 CAPSULE ORAL at 09:35

## 2020-12-11 RX ADMIN — Medication 1 DROP(S): at 05:45

## 2020-12-11 RX ADMIN — Medication 1000 MILLIGRAM(S): at 05:44

## 2020-12-11 RX ADMIN — Medication 1000 MILLIGRAM(S): at 13:11

## 2020-12-11 RX ADMIN — Medication 1000 MILLIGRAM(S): at 06:05

## 2020-12-11 RX ADMIN — CELECOXIB 100 MILLIGRAM(S): 200 CAPSULE ORAL at 09:05

## 2020-12-11 RX ADMIN — Medication 1000 MILLIGRAM(S): at 13:43

## 2020-12-11 RX ADMIN — TRAMADOL HYDROCHLORIDE 25 MILLIGRAM(S): 50 TABLET ORAL at 09:05

## 2020-12-11 RX ADMIN — PANTOPRAZOLE SODIUM 40 MILLIGRAM(S): 20 TABLET, DELAYED RELEASE ORAL at 05:44

## 2020-12-11 RX ADMIN — TRAMADOL HYDROCHLORIDE 25 MILLIGRAM(S): 50 TABLET ORAL at 09:35

## 2020-12-11 RX ADMIN — Medication 81 MILLIGRAM(S): at 05:45

## 2020-12-11 NOTE — PROGRESS NOTE ADULT - SUBJECTIVE AND OBJECTIVE BOX
Procedure:  Left TKR  POD #: 3  S: Pt without complaints. No SOB,CP, N/V. Tolerated Diet well. Feeling better after transfusion.  Pain comfortable  on  Interval Rx. Rarely using tramadol 25  +bm + flatus, No abdominal pain.  Voiding well  Pain Rx:  acetaminophen   Tablet .. 1000 milliGRAM(s) Oral every 8 hours  celecoxib 100 milliGRAM(s) Oral every 12 hours  HYDROmorphone  Injectable 0.5 milliGRAM(s) IV Push every 3 hours PRN  ondansetron Injectable 4 milliGRAM(s) IV Push every 6 hours PRN  traMADol 25 milliGRAM(s) Oral every 4 hours PRN  traMADol 50 milliGRAM(s) Oral every 4 hours PRN    O: General: On exam, No Apparent Distress  Vital Signs Last 24 Hrs  T(C): 36.3 (11 Dec 2020 07:43), Max: 37.1 (10 Dec 2020 15:30)  T(F): 97.3 (11 Dec 2020 07:43), Max: 98.8 (10 Dec 2020 23:15)  HR: 65 (11 Dec 2020 07:43) (59 - 99)  BP: 165/66 (11 Dec 2020 07:43) (136/76 - 174/66)  BP(mean): --  RR: 16 (11 Dec 2020 03:15) (15 - 18)  SpO2: 99% (11 Dec 2020 03:15) (96% - 100%)    Lungs: BS clear bilat.  Heart: RRR no murmur  Abdomen: + BS, soft , benign exam     Ext --left knee dressing changed.  Small amt old dry blood on old dressing.  New gauze applied under spandage  ROM:  0-85  Neurologic:  Has sensation over feet & toes bilat. Full AROM bilat feet & toes. EHL/AT = 5/5  Vascular: Feet toes warm, pink. DP = 2+. No calf tenderness bilat..  VTEP: On Bilat. Venodynes + aspirin enteric coated 81 milliGRAM(s) Oral every 12 hours    I&O's Detail      Activity in PT yesterday Noted. Walked 300' and climbed 15 steps  Labs yesterday noted.  Hospitalist input noted.  Labs Today:                         8.9    6.27  )-----------( 115      ( 11 Dec 2020 06:02 )             26.6       12-11    142  |  109<H>  |  14  ----------------------------<  92  4.5   |  29  |  0.85    Ca    9.3      11 Dec 2020 06:02        Primary Orthopedic Assessment:  • Stable from Orthopedic perspective  • Neuro motor exam stable  • Labs: CBC / Chem stable      Plan:   • Continue:  PT/OT/WBAT with assistance of a walker/Ice to knee/ Knee ROM         Incentive spirometry encouraged   • Continue DVT prophylaxis as prescribed, including use of compression devices and ankle pumps  • Continue Pain Rx  • Plans per Medicine / Cardiology  • Discharge planning – anticipated discharge is Home D/C with home care & home PT when medically stable & cleared by PT/OT--today

## 2020-12-11 NOTE — PROGRESS NOTE ADULT - SUBJECTIVE AND OBJECTIVE BOX
INTERVAL HPI/OVERNIGHT EVENTS:   Patient seen and examined.  No fevers, chills, sweats, dizziness, HA, changes in vision, cp, palpitations, sob, persistent cough, n/v/d, abd pain, dysuria, focal weakness, or calf pain.   Walked into hallway with PT yesterday 2x w/o dizziness or near syncope.  Feels much better (more energy) after transfusion.      REVIEW OF SYSTEMS:  See HPI,  all others negative    PHYSICAL EXAM:  Vital Signs Last 24 Hrs  T(C): 36.3 (11 Dec 2020 07:43), Max: 37.1 (10 Dec 2020 15:30)  T(F): 97.3 (11 Dec 2020 07:43), Max: 98.8 (10 Dec 2020 23:15)  HR: 65 (11 Dec 2020 07:43) (59 - 99)  BP: 165/66 (11 Dec 2020 07:43) (136/76 - 174/66)  BP(mean): --  RR: 16 (11 Dec 2020 03:15) (15 - 18)  SpO2: 99% (11 Dec 2020 03:15) (96% - 100%)    GENERAL: NAD, well-groomed, well-developed, awake, alert, oriented x 3, fluent and coherent speech, thin build, sitting up in chair  NERVOUS SYSTEM:  Good concentration; Moving all 4 extremities against gravity and resistance; No gross sensory deficits, No facial droop  CHEST/LUNG: Clear to auscultation bilaterally with good air entry; No rales, rhonchi, wheezing, or rubs  HEART: Regular rate and rhythm; No murmurs, rubs, or gallops  ABDOMEN: Soft, Nontender, Nondistended, Bowel sounds present, No palpable masses or organomegaly, No bruits  EXTREMITIES:  2+ Peripheral Pulses, No clubbing, cyanosis, or edema, no calf tenderness in either leg  WOUND: dressing c/d/i, periwound erythema within expected limits    Diagnostic Testin.9    6.27  )-----------( 115      ( 11 Dec 2020 06:02 )             26.6     12-11    142  |  109<H>  |  14  ----------------------------<  92  4.5   |  29  |  0.85    Ca    9.3      11 Dec 2020 06:02

## 2020-12-11 NOTE — PROGRESS NOTE ADULT - PROBLEM SELECTOR PLAN 1
s/p PRBC on 12 10 2020  80 year old female with a history of HTN, HL, CVA who is admitted s/p TKR.  near syncope episode eval noted, s/p RRT on 2 occasions on this admission  plan for CBC repeat today at noon - cm notes reviewed - dc planning -   pt is on room air  hypoxemia resolved  cardio eval noted  pt is on ASA BID for dvt p  CE trend noted and reviewed  caution with Opioids  Bowel regimen  med reconciliation noted and reviewed.

## 2020-12-11 NOTE — PROGRESS NOTE ADULT - SUBJECTIVE AND OBJECTIVE BOX
Chief Complaint: TKR    Interval Events: No events overnight. Transfused yesterday. No complaints.    Review of Systems:  General: No fevers, chills, weight loss or gain  Skin: No rashes, color changes  Cardiovascular: No chest pain, orthopnea  Respiratory: No shortness of breath, cough  Gastrointestinal: No nausea, abdominal pain  Genitourinary: No incontinence, pain with urination  Musculoskeletal: No pain, swelling, decreased range of motion  Neurological: No headache, weakness  Psychiatric: No depression, anxiety  Endocrine: No weight loss or gain, increased thirst  All other systems are comprehensively negative.    Physical Exam:  Vital Signs Last 24 Hrs  T(C): 36.3 (11 Dec 2020 07:43), Max: 37.1 (10 Dec 2020 15:30)  T(F): 97.3 (11 Dec 2020 07:43), Max: 98.8 (10 Dec 2020 23:15)  HR: 65 (11 Dec 2020 07:43) (59 - 99)  BP: 165/66 (11 Dec 2020 07:43) (136/76 - 174/66)  BP(mean): --  RR: 16 (11 Dec 2020 03:15) (15 - 18)  SpO2: 99% (11 Dec 2020 03:15) (96% - 100%)  General: NAD  HEENT: MMM  Neck: No JVD, no carotid bruit  Lungs: CTAB  CV: RRR, nl S1/S2, no M/R/G  Abdomen: S/NT/ND, +BS  Extremities: No LE edema, no cyanosis  Neuro: AAOx3, non-focal  Skin: No rash    Labs:             12-11    142  |  109<H>  |  14  ----------------------------<  92  4.5   |  29  |  0.85    Ca    9.3      11 Dec 2020 06:02                          8.9    6.27  )-----------( 115      ( 11 Dec 2020 06:02 )             26.6       Telemetry: Sinus rhythm

## 2020-12-11 NOTE — PROGRESS NOTE ADULT - ASSESSMENT
The patient is a 80 year old female with a history of HTN, HL, CVA who is admitted s/p TKR.    Plan:  - Episodes likely vasovagal in nature, in part contributed by dehydration, anemia and pain medication  - No significant bradyarrhythmias noted; intermittent sinus bradycardia and rare blocked PACs  - Hemoglobin improved after transfusion  - Continue losartan 100 mg daily  - Discharge planning

## 2020-12-11 NOTE — PROGRESS NOTE ADULT - SUBJECTIVE AND OBJECTIVE BOX
Date/Time Patient Seen:  		  Referring MD:   Data Reviewed	       Patient is a 80y old  Female who presents with a chief complaint of Patient is a 80y old  Female who presents with a chief complaint of left knee pain-- for left TKA (10 Dec 2020 07:30) (11 Dec 2020 08:26)      Subjective/HPI     PAST MEDICAL & SURGICAL HISTORY:  Dry eye syndrome    Osteoarthritis    Hyperlipidemia    History of short term memory loss  started on Aricept for early onset dementia  (11/2020)    Kidney stones  s/p lithotripsy    Cerebrovascular accident (CVA)  2016 - tx with TPA - no residual    HTN (hypertension)    S/P arthroscopy of left knee  8/2020    Benign cyst of breast  Excision of bilatral breast cyst 1970&#x27;s    History of lithotripsy  [facility] June,2020    History of cataract surgery  bilateral 2018    S/P ORIF (open reduction internal fixation) fracture  right wrist 1993          Medication list         MEDICATIONS  (STANDING):  acetaminophen   Tablet .. 1000 milliGRAM(s) Oral every 8 hours  artificial  tears Solution 1 Drop(s) Both EYES two times a day  aspirin enteric coated 81 milliGRAM(s) Oral every 12 hours  atorvastatin 20 milliGRAM(s) Oral at bedtime  celecoxib 100 milliGRAM(s) Oral every 12 hours  losartan 100 milliGRAM(s) Oral daily  magnesium oxide 400 milliGRAM(s) Oral daily  pantoprazole    Tablet 40 milliGRAM(s) Oral before breakfast  polyethylene glycol 3350 17 Gram(s) Oral daily    MEDICATIONS  (PRN):  aluminum hydroxide/magnesium hydroxide/simethicone Suspension 30 milliLiter(s) Oral four times a day PRN Indigestion  bisacodyl Suppository 10 milliGRAM(s) Rectal daily PRN If no bowel movement by postoperative day #2  hydrALAZINE 25 milliGRAM(s) Oral every 6 hours PRN SBP>190 or DBP>100  HYDROmorphone  Injectable 0.5 milliGRAM(s) IV Push every 3 hours PRN breakthrough  magnesium hydroxide Suspension 30 milliLiter(s) Oral daily PRN Constipation  ondansetron Injectable 4 milliGRAM(s) IV Push every 6 hours PRN Nausea and/or Vomiting  senna 2 Tablet(s) Oral at bedtime PRN Constipation  traMADol 25 milliGRAM(s) Oral every 4 hours PRN Moderate Pain (4 - 6)  traMADol 50 milliGRAM(s) Oral every 4 hours PRN Severe Pain (7 - 10)         Vitals log        ICU Vital Signs Last 24 Hrs  T(C): 36.3 (11 Dec 2020 07:43), Max: 37.1 (10 Dec 2020 15:30)  T(F): 97.3 (11 Dec 2020 07:43), Max: 98.8 (10 Dec 2020 23:15)  HR: 65 (11 Dec 2020 07:43) (59 - 99)  BP: 165/66 (11 Dec 2020 07:43) (136/76 - 174/66)  BP(mean): --  ABP: --  ABP(mean): --  RR: 16 (11 Dec 2020 03:15) (15 - 18)  SpO2: 99% (11 Dec 2020 03:15) (96% - 100%)           Input and Output:  I&O's Detail      Lab Data                        8.9    6.27  )-----------( 115      ( 11 Dec 2020 06:02 )             26.6     12-11    142  |  109<H>  |  14  ----------------------------<  92  4.5   |  29  |  0.85    Ca    9.3      11 Dec 2020 06:02              Review of Systems	      Objective     Physical Examination    heart s1s2  lung dec BS  abd soft      Pertinent Lab findings & Imaging      Nuzhat:  NO   Adequate UO     I&O's Detail           Discussed with:     Cultures:	        Radiology

## 2020-12-11 NOTE — PROGRESS NOTE ADULT - REASON FOR ADMISSION
left knee pain-- for left TKA
left knee pain-- for left TKA
left knee pain--for left TKA
"I am having my left knee replaced"
Patient is a 80y old  Female who presents with a chief complaint of left knee pain-- for left TKA (10 Dec 2020 07:30)
Patient is a 80y old  Female who presents with a chief complaint of left knee pain-- for left TKA (10 Dec 2020 07:30)
left knee pain

## 2020-12-17 NOTE — PHYSICAL THERAPY INITIAL EVALUATION ADULT - IMPAIRMENTS FOUND, PT EVAL
67 yo male PMH DM, HTN, HLD, glaucoma, CKD III, kidney stones,  renal mass s/p nephrectomy, obesity, COVID positive on 12/10 c/o persistent cough and SOB which is worsening since yesterday.  Reportedly low oxygen saturation as per EMS ( 89% RA).  Pt was recently admitted to [facility] from 12/4 to 12/15 for MSSA bactermia secondary to Right hallux ulcer and cellulitis. Was d/c on IV cefazolin.   Patient was just d/c from the hospital a few days ago, is taking abx for toe cellulitis. Pt was tested positive for Covid on 12/10 during admisison.  No fever, CP, hemoptysis, no vomiting or abdominal pain, no change in leg swelling.  Morbidly obese male, NAD, PERRL, mmm, + tachypnea without acute respiratory distress, inspiratory crackles b/l bases, RRR, well-perfused extremities, abdomen soft, NT, no calf ttp, right calf>left, but as patient this is unchanged, A&Ox3, no gross neuro deficits.  Saturating well on NR mask, will check labs, CXR, admit.   Symptoms most likely due to COVID-19 .   In the ED, /78, HR 98, Temp 97.7, RR 18, saturating well on NRB mask. Labs significant for d-dimer of 605, Na 131, K 5.7, Cr 2.4, GFR 27 (around baseline fro previous admission). Pt to be admitted under medicine.   68 years old male with PMHx of DM, HTN, HLD, glaucoma, CKD III, nehroithiasis, renal mass s/p nephrectomy, obesity, recently discharged from [facility] for MSSA bactermia, COVID positive on 12/10 c/o persistent cough and SOB which is worsening since yesterday.   Pt was recently admitted to [facility] from 12/4 to 12/15 for MSSA bacteremia secondary to Right hallux ulcer and cellulitis. Was d/c on IV cefazolin with out atient follow up with cadiology for CHEIKH and podiatry.    Pt was tested positive for Covid on 12/10 during admission. Yetserday morning atient started developing shortness of breath which has been progressively increasing and associated with cough. Pt called EMS and pulse O2 was found to be around 89% on room air.   No fever, CP, hemoptysis, no vomiting or abdominal pain, no change in leg swelling.    In the ED, /78, HR 98, Temp 97.7, RR 18, saturating well on NRB mask. Labs significant for d-dimer of 605, Na 131, K 5.7, Cr 2.4, GFR 27 (around baseline fro previous admission). Pt to be admitted under medicine.   68 years old male with PMHx of DM, HTN, HLD, glaucoma, CKD III, nehroithiasis, renal mass s/p nephrectomy, obesity, recently discharged from [facility] for MSSA bactermia, COVID positive on 12/10 c/o persistent cough and SOB which is worsening since yesterday.   Pt was recently admitted to [facility] from 12/4 to 12/15 for MSSA bacteremia secondary to Right hallux ulcer and cellulitis. Was d/c on IV cefazolin with out atient follow up with cadiology for CHEIKH and podiatry.    Pt was tested positive for Covid on 12/10 during admission. Yetserday morning patient started developing shortness of breath which has been progressively increasing and associated with cough. Pt called EMS and pulse O2 was found to be around 89% on room air.   No fever, CP, hemoptysis, no vomiting or abdominal pain, no change in leg swelling.    In the ED, /78, HR 98, Temp 97.7, RR 18, saturating well on NRB mask. Labs significant for d-dimer of 605, Na 131, K 5.7, Cr 2.4, GFR 27 (around baseline fro previous admission). Pt to be admitted under medicine.   gait, locomotion, and balance

## 2020-12-21 ENCOUNTER — APPOINTMENT (OUTPATIENT)
Dept: ORTHOPEDIC SURGERY | Facility: CLINIC | Age: 80
End: 2020-12-21
Payer: MEDICARE

## 2020-12-21 VITALS — TEMPERATURE: 97.3 F | HEIGHT: 62 IN | BODY MASS INDEX: 25.03 KG/M2 | WEIGHT: 136 LBS

## 2020-12-21 PROCEDURE — 73562 X-RAY EXAM OF KNEE 3: CPT | Mod: LT

## 2020-12-21 PROCEDURE — 99024 POSTOP FOLLOW-UP VISIT: CPT

## 2020-12-21 RX ORDER — AMOXICILLIN 500 MG/1
500 TABLET, FILM COATED ORAL
Qty: 20 | Refills: 1 | Status: ACTIVE | COMMUNITY
Start: 2020-12-21 | End: 1900-01-01

## 2020-12-28 PROCEDURE — 86850 RBC ANTIBODY SCREEN: CPT

## 2020-12-28 PROCEDURE — 84100 ASSAY OF PHOSPHORUS: CPT

## 2020-12-28 PROCEDURE — 97110 THERAPEUTIC EXERCISES: CPT

## 2020-12-28 PROCEDURE — 82247 BILIRUBIN TOTAL: CPT

## 2020-12-28 PROCEDURE — 85027 COMPLETE CBC AUTOMATED: CPT

## 2020-12-28 PROCEDURE — 93005 ELECTROCARDIOGRAM TRACING: CPT

## 2020-12-28 PROCEDURE — 84295 ASSAY OF SERUM SODIUM: CPT

## 2020-12-28 PROCEDURE — 97535 SELF CARE MNGMENT TRAINING: CPT

## 2020-12-28 PROCEDURE — 80048 BASIC METABOLIC PNL TOTAL CA: CPT

## 2020-12-28 PROCEDURE — 94664 DEMO&/EVAL PT USE INHALER: CPT

## 2020-12-28 PROCEDURE — 86901 BLOOD TYPING SEROLOGIC RH(D): CPT

## 2020-12-28 PROCEDURE — 73562 X-RAY EXAM OF KNEE 3: CPT

## 2020-12-28 PROCEDURE — 83036 HEMOGLOBIN GLYCOSYLATED A1C: CPT

## 2020-12-28 PROCEDURE — 88311 DECALCIFY TISSUE: CPT

## 2020-12-28 PROCEDURE — 36415 COLL VENOUS BLD VENIPUNCTURE: CPT

## 2020-12-28 PROCEDURE — P9016: CPT

## 2020-12-28 PROCEDURE — 82565 ASSAY OF CREATININE: CPT

## 2020-12-28 PROCEDURE — 88305 TISSUE EXAM BY PATHOLOGIST: CPT

## 2020-12-28 PROCEDURE — 97165 OT EVAL LOW COMPLEX 30 MIN: CPT

## 2020-12-28 PROCEDURE — 82550 ASSAY OF CK (CPK): CPT

## 2020-12-28 PROCEDURE — C1776: CPT

## 2020-12-28 PROCEDURE — 86923 COMPATIBILITY TEST ELECTRIC: CPT

## 2020-12-28 PROCEDURE — 97530 THERAPEUTIC ACTIVITIES: CPT

## 2020-12-28 PROCEDURE — 86900 BLOOD TYPING SEROLOGIC ABO: CPT

## 2020-12-28 PROCEDURE — 97161 PT EVAL LOW COMPLEX 20 MIN: CPT

## 2020-12-28 PROCEDURE — 36430 TRANSFUSION BLD/BLD COMPNT: CPT

## 2020-12-28 PROCEDURE — 83735 ASSAY OF MAGNESIUM: CPT

## 2020-12-28 PROCEDURE — C1713: CPT

## 2020-12-28 PROCEDURE — 82553 CREATINE MB FRACTION: CPT

## 2020-12-28 PROCEDURE — 85610 PROTHROMBIN TIME: CPT

## 2020-12-28 PROCEDURE — 82962 GLUCOSE BLOOD TEST: CPT

## 2020-12-28 PROCEDURE — 97116 GAIT TRAINING THERAPY: CPT

## 2020-12-28 PROCEDURE — 84484 ASSAY OF TROPONIN QUANT: CPT

## 2021-01-01 NOTE — H&P PST ADULT - BLOOD TRANSFUSION, PREVIOUS, PROFILE
1. I was told the name of the physician that took care of my child while in the hospital.    2. I have been told about any important findings on my child's physical exam and my child's plan of care.    3. The doctor clearly explained my child's diagnosis and other possible diagnoses that were considered.    4. My child's doctor explained all the tests that were done and their results (if available). I understand that some of the test results may not be ready before we go home and I was told how I can get these results. I understand that a summary of my child's hospitalization and important test results will be shared with my child's outpatient doctor.    5. My child's doctor talked to me about what I need to do when we go home.    6. I understand what signs and symptoms to watch for. I understand what symptoms I would need to call my doctor for and/or return to the hospital.    7. I have the phone number to call the hospital for results and/or questions after I leave the hospital.
no

## 2021-01-27 ENCOUNTER — APPOINTMENT (OUTPATIENT)
Dept: ORTHOPEDIC SURGERY | Facility: CLINIC | Age: 81
End: 2021-01-27
Payer: MEDICARE

## 2021-01-27 VITALS — HEART RATE: 65 BPM | DIASTOLIC BLOOD PRESSURE: 73 MMHG | TEMPERATURE: 97.2 F | SYSTOLIC BLOOD PRESSURE: 189 MMHG

## 2021-01-27 PROCEDURE — 99024 POSTOP FOLLOW-UP VISIT: CPT

## 2021-01-27 PROCEDURE — 73562 X-RAY EXAM OF KNEE 3: CPT | Mod: LT

## 2021-04-05 ENCOUNTER — APPOINTMENT (OUTPATIENT)
Dept: ORTHOPEDIC SURGERY | Facility: CLINIC | Age: 81
End: 2021-04-05
Payer: MEDICARE

## 2021-04-05 VITALS — BODY MASS INDEX: 25.03 KG/M2 | WEIGHT: 136 LBS | TEMPERATURE: 97 F | HEIGHT: 62 IN

## 2021-04-05 PROCEDURE — 99072 ADDL SUPL MATRL&STAF TM PHE: CPT

## 2021-04-05 PROCEDURE — 73562 X-RAY EXAM OF KNEE 3: CPT | Mod: LT

## 2021-04-05 PROCEDURE — 99213 OFFICE O/P EST LOW 20 MIN: CPT

## 2021-04-05 RX ORDER — LOSARTAN POTASSIUM 100 MG/1
100 TABLET, FILM COATED ORAL
Qty: 90 | Refills: 0 | Status: ACTIVE | COMMUNITY
Start: 2021-02-18

## 2021-04-05 RX ORDER — DONEPEZIL HYDROCHLORIDE 10 MG/1
10 TABLET ORAL
Qty: 90 | Refills: 0 | Status: ACTIVE | COMMUNITY
Start: 2020-12-14

## 2021-04-05 RX ORDER — ALENDRONATE SODIUM 35 MG/1
35 TABLET ORAL
Qty: 12 | Refills: 0 | Status: ACTIVE | COMMUNITY
Start: 2020-12-15

## 2021-08-18 ENCOUNTER — APPOINTMENT (OUTPATIENT)
Dept: ORTHOPEDIC SURGERY | Facility: CLINIC | Age: 81
End: 2021-08-18
Payer: MEDICARE

## 2021-08-18 ENCOUNTER — NON-APPOINTMENT (OUTPATIENT)
Age: 81
End: 2021-08-18

## 2021-08-18 VITALS — HEART RATE: 60 BPM | SYSTOLIC BLOOD PRESSURE: 197 MMHG | DIASTOLIC BLOOD PRESSURE: 76 MMHG

## 2021-08-18 PROCEDURE — 73562 X-RAY EXAM OF KNEE 3: CPT | Mod: LT

## 2021-08-18 PROCEDURE — 99213 OFFICE O/P EST LOW 20 MIN: CPT

## 2021-11-29 ENCOUNTER — APPOINTMENT (OUTPATIENT)
Dept: ORTHOPEDIC SURGERY | Facility: CLINIC | Age: 81
End: 2021-11-29
Payer: MEDICARE

## 2021-11-29 VITALS — SYSTOLIC BLOOD PRESSURE: 180 MMHG | HEART RATE: 61 BPM | DIASTOLIC BLOOD PRESSURE: 90 MMHG

## 2021-11-29 PROCEDURE — 99214 OFFICE O/P EST MOD 30 MIN: CPT

## 2021-11-29 PROCEDURE — 73562 X-RAY EXAM OF KNEE 3: CPT | Mod: LT

## 2021-11-29 RX ORDER — CLOPIDOGREL BISULFATE 75 MG/1
75 TABLET, FILM COATED ORAL
Qty: 30 | Refills: 0 | Status: ACTIVE | COMMUNITY
Start: 2021-11-20

## 2021-11-29 RX ORDER — AMLODIPINE BESYLATE 5 MG/1
5 TABLET ORAL
Qty: 30 | Refills: 0 | Status: ACTIVE | COMMUNITY
Start: 2021-11-20

## 2021-11-29 RX ORDER — MEMANTINE HYDROCHLORIDE 10 MG/1
10 TABLET, FILM COATED ORAL
Qty: 90 | Refills: 0 | Status: ACTIVE | COMMUNITY
Start: 2021-10-12

## 2021-11-29 RX ORDER — AMLODIPINE BESYLATE 2.5 MG/1
2.5 TABLET ORAL
Qty: 90 | Refills: 0 | Status: ACTIVE | COMMUNITY
Start: 2021-04-27

## 2022-07-13 ENCOUNTER — APPOINTMENT (OUTPATIENT)
Dept: ORTHOPEDIC SURGERY | Facility: CLINIC | Age: 82
End: 2022-07-13

## 2022-07-13 PROCEDURE — 73562 X-RAY EXAM OF KNEE 3: CPT | Mod: LT

## 2022-07-13 PROCEDURE — 99214 OFFICE O/P EST MOD 30 MIN: CPT

## 2022-07-16 NOTE — OCCUPATIONAL THERAPY INITIAL EVALUATION ADULT - EATING, PREVIOUS LEVEL OF FUNCTION, OT EVAL
independent
no abdominal pain, no bloating, no constipation, no diarrhea, no nausea and no vomiting.
normal

## 2022-11-20 ENCOUNTER — NON-APPOINTMENT (OUTPATIENT)
Age: 82
End: 2022-11-20

## 2022-11-23 ENCOUNTER — APPOINTMENT (OUTPATIENT)
Dept: ORTHOPEDIC SURGERY | Facility: CLINIC | Age: 82
End: 2022-11-23

## 2022-11-23 ENCOUNTER — NON-APPOINTMENT (OUTPATIENT)
Age: 82
End: 2022-11-23

## 2022-11-23 VITALS — WEIGHT: 124 LBS | BODY MASS INDEX: 22.82 KG/M2 | HEIGHT: 62 IN

## 2022-11-23 DIAGNOSIS — M25.541 PAIN IN JOINTS OF RIGHT HAND: ICD-10-CM

## 2022-11-23 PROCEDURE — 73030 X-RAY EXAM OF SHOULDER: CPT | Mod: LT

## 2022-11-23 PROCEDURE — 99214 OFFICE O/P EST MOD 30 MIN: CPT

## 2022-11-23 PROCEDURE — 73140 X-RAY EXAM OF FINGER(S): CPT | Mod: F9

## 2022-11-28 ENCOUNTER — NON-APPOINTMENT (OUTPATIENT)
Age: 82
End: 2022-11-28

## 2022-12-07 ENCOUNTER — APPOINTMENT (OUTPATIENT)
Dept: ORTHOPEDIC SURGERY | Facility: CLINIC | Age: 82
End: 2022-12-07

## 2022-12-07 PROCEDURE — 99213 OFFICE O/P EST LOW 20 MIN: CPT

## 2022-12-07 PROCEDURE — 73140 X-RAY EXAM OF FINGER(S): CPT | Mod: RT,F9

## 2022-12-07 NOTE — HISTORY OF PRESENT ILLNESS
[Right] : right hand dominant [FreeTextEntry1] : She comes in today for evaluation of an injury to her right little finger and left shoulder after a fall off of her treadmill 2 days ago.  She injured her right little finger.  She does not recall falling on her left shoulder but she has noted pain at the left shoulder\par \par I have treated her in the past for left shoulder impingement syndrome.  She was previously given 2 subacromial cortisone she was last seen in the office in this regard greater than 2 years ago and she was referred to physical therapy.\par \par She ambulates with the assistance of a cane. \par \par She was accompanied by her daughter today.

## 2022-12-07 NOTE — PHYSICAL EXAM
[de-identified] : - Constitutional: This is a female in no obvious distress.  She was accompanied by her daughter today.\par - Psych: Patient is alert and oriented to person, place and time.  Patient has a normal mood and affect.\par - Cardiovascular: Normal pulses throughout the upper extremities.  No significant varicosities are noted in the upper extremities. \par - Neuro: Strength and sensation are intact throughout the upper extremities.  Patient has normal coordination.\par - Respiratory:  Patient exhibits no evidence of shortness of breath or difficulty breathing.\par - Skin: No rashes, lesions, or other abnormalities are noted in the upper extremities.\par \par ---\par \par Examination of her right little finger demonstrates her abrasion to be healing well.  There is decreased swelling..  She has limitation of flexion and extension.  She does have active extension at the PIP joint, but she does lack terminal extension at the PIP joint.  She is neurovascularly intact distally. [de-identified] : Repeat AP, lateral and oblique radiographs of her right little finger demonstrate an avulsion fracture off of the little finger DIP joint radial collateral ligament and a possible fracture at the little finger distal phalanx at the insertion of the ulnar collateral ligament.  On the lateral view, there is evidence of an avulsion fracture off of the dorsal base of the middle phalanx at the insertion of the central slip of the extensor tendon, a fragment noted dorsal to the middle phalanx as well as the volar plate avulsion fracture.\par \par Recent AP and Y radiographs of her left shoulder demonstrated no obvious fractures or dislocations.\par \par A PREVIOUS MRI of her left shoulder demonstrated moderate grade articular surface tearing at the mid fibers of the supraspinatus tendon with articular surface retraction proximally.  Superimposed focal full-thickness perforation at the insertion of the mid fibers of the supraspinatus.  Insertional tear of the infraspinatus.  Market intra-articular biceps tendinosis with longitudinal tearing at the anchor with an associated SLAP tear.\par \par

## 2022-12-07 NOTE — END OF VISIT
[FreeTextEntry3] : This note was written by Aleah Doyle on 12/07/2022 acting solely as a scribe for Dr. Corky Sandoval.\par  \par All medical record entries made by the Scribe were at my, Dr. Corky Sandoval, direction and personally dictated by me on 12/07/2022. I have personally reviewed the chart and agree that the record accurately reflects my personal performance of the history, physical exam, assessment and plan.

## 2022-12-07 NOTE — DISCUSSION/SUMMARY
[FreeTextEntry1] : I had a discussion regarding today's visit, the diagnosis and treatment recommendations and options.  We also discussed changes since the last visit.  She has findings consistent with a sprain/strain of her left shoulder with underlying impingement syndrome and a prior injury.  She also sustained avulsion fractures off of her right little finger, most notably a central slip avulsion fracture.\par \par With regard to the left shoulder, I recommended observation and activity modification.\par \par With regard to the right little finger, I recommended essentially full-time splinting, she was instructed on local wound care and will apply antibiotic ointment to the wound.  She will follow-up in 2 weeks.  They do understand that she may have a loss of motion and a potential boutonniere deformity.\par \par The patient and her daughter have agreed to the above plan of management and has expressed full understanding.  All questions were fully answered to their satisfaction.\par \par My cumulative time spent on today's visit was greater than 30 minutes and included: Preparation for the visit, review of the medical records, review of pertinent diagnostic studies, examination and counseling of the patient on the above diagnosis, treatment plan and prognosis, orders of diagnostic tests, medications and/or appropriate procedures and documentation in the medical records of today's visit.

## 2022-12-07 NOTE — ADDENDUM
[FreeTextEntry1] : I, Aleah Doyle, acted solely as a scribe for Dr. Sandoval on this date on 12/07/2022.  patient

## 2022-12-07 NOTE — PHYSICAL EXAM
[de-identified] : - Constitutional: This is a female in no obvious distress.  She was accompanied by her daughter today.\par - Psych: Patient is alert and oriented to person, place and time.  Patient has a normal mood and affect.\par - Cardiovascular: Normal pulses throughout the upper extremities.  No significant varicosities are noted in the upper extremities. \par - Neuro: Strength and sensation are intact throughout the upper extremities.  Patient has normal coordination.\par - Respiratory:  Patient exhibits no evidence of shortness of breath or difficulty breathing.\par - Skin: No rashes, lesions, or other abnormalities are noted in the upper extremities.\par \par ---\par \par Examination of her right little finger demonstrates an abrasion along the dorsal aspect of the middle phalanx.  There is no sign of an infection.  She is swollen and tender along the PIP joint and DIP joint.  She has limitation of flexion and extension.  She does have active extension at the PIP joint.  She is neurovascularly intact distally. [de-identified] : AP, lateral and oblique radiographs of her right little finger demonstrate avulsion fracture off of the little finger DIP joint radio collateral ligament and a possible fracture at the little finger distal phalanx at the insertion of the ulnar collateral ligament.  On the lateral view, there is evidence of an avulsion fracture off of the dorsal base of the middle phalanx at the insertion of the central slip of the extensor tendon, a fragment noted dorsal to the middle phalanx as well as the volar plate avulsion fracture.\par \par AP and Y radiographs of her left shoulder demonstrate no obvious fractures or dislocations.\par \par A PREVIOUS MRI of her left shoulder demonstrated moderate grade articular surface tearing at the mid fibers of the supraspinatus tendon with articular surface retraction proximally.  Superimposed focal full-thickness perforation at the insertion of the mid fibers of the supraspinatus.  Insertional tear of the infraspinatus.  Market intra-articular biceps tendinosis with longitudinal tearing at the anchor with an associated SLAP tear.\par \par

## 2022-12-07 NOTE — HISTORY OF PRESENT ILLNESS
[FreeTextEntry1] : 16 days status post fall resulting in sprain/strain of her left shoulder with underlying impingement syndrome and a prior injury.  She also sustained avulsion fractures off of her right little finger, most notably a central slip avulsion fracture.\par \par Her right little finger was splinted and I recommended symptomatic treatment for her left shoulder.\par \par She states her shoulder pain is improving. She however reports continued pain to the right little finger. She has remained in the splint the majority of the time. She however has not bent the finger.\par \par I have treated her in the past for left shoulder impingement syndrome.  She was previously given 2 subacromial cortisone she was last seen in the office in this regard greater than 2 years ago and she was referred to physical therapy.\par \par She ambulates with the assistance of a cane. \par \par She was accompanied by her daughter today.

## 2022-12-12 ENCOUNTER — NON-APPOINTMENT (OUTPATIENT)
Age: 82
End: 2022-12-12

## 2022-12-14 PROBLEM — M75.42 IMPINGEMENT SYNDROME OF LEFT SHOULDER: Status: ACTIVE | Noted: 2019-11-22

## 2022-12-21 ENCOUNTER — APPOINTMENT (OUTPATIENT)
Dept: ORTHOPEDIC SURGERY | Facility: CLINIC | Age: 82
End: 2022-12-21

## 2022-12-21 DIAGNOSIS — M75.42 IMPINGEMENT SYNDROME OF LEFT SHOULDER: ICD-10-CM

## 2022-12-21 PROCEDURE — 99213 OFFICE O/P EST LOW 20 MIN: CPT

## 2022-12-21 PROCEDURE — 73140 X-RAY EXAM OF FINGER(S): CPT | Mod: RT,F5

## 2022-12-21 NOTE — END OF VISIT
[FreeTextEntry3] : This note was written by Aleah Doyle on 12/21/2022 acting solely as a scribe for Dr. Corky Sandoval.\par  \par All medical record entries made by the Scribe were at my, Dr. Corky Sandoval, direction and personally dictated by me on 12/21/2022. I have personally reviewed the chart and agree that the record accurately reflects my personal performance of the history, physical exam, assessment and plan.

## 2022-12-21 NOTE — PHYSICAL EXAM
[de-identified] : - Constitutional: This is a female in no obvious distress.  She was accompanied by her daughter today.\par - Psych: Patient is alert and oriented to person, place and time.  Patient has a normal mood and affect.\par - Cardiovascular: Normal pulses throughout the upper extremities.  No significant varicosities are noted in the upper extremities. \par - Neuro: Strength and sensation are intact throughout the upper extremities.  Patient has normal coordination.\par - Respiratory:  Patient exhibits no evidence of shortness of breath or difficulty breathing.\par - Skin: No rashes, lesions, or other abnormalities are noted in the upper extremities.\par \par ---\par \par Examination of her right little finger demonstrates her abrasion to be healed.  There is decreased swelling..  She has limitation of flexion and extension.  The finger is held in full extension without a residual droop at the PIP joint.  She is neurovascularly intact distally. [de-identified] : Repeat AP, lateral and oblique radiographs of her right little finger demonstrate an avulsion fracture off of the little finger DIP joint radial collateral ligament and a possible fracture at the little finger distal phalanx at the insertion of the ulnar collateral ligament.  On the lateral view, there is evidence of an avulsion fracture off of the dorsal base of the middle phalanx at the insertion of the central slip of the extensor tendon, a fragment noted dorsal to the middle phalanx as well as the volar plate avulsion fracture.  There is no obvious healing noted.\par \par Recent AP and Y radiographs of her left shoulder demonstrated no obvious fractures or dislocations.\par \par A PREVIOUS MRI of her left shoulder demonstrated moderate grade articular surface tearing at the mid fibers of the supraspinatus tendon with articular surface retraction proximally.  Superimposed focal full-thickness perforation at the insertion of the mid fibers of the supraspinatus.  Insertional tear of the infraspinatus.  Market intra-articular biceps tendinosis with longitudinal tearing at the anchor with an associated SLAP tear.\par \par

## 2022-12-21 NOTE — DISCUSSION/SUMMARY
[FreeTextEntry1] : I had a discussion regarding today's visit, the diagnosis and treatment recommendations and options.  We also discussed changes since the last visit.  \par \par With regard to the left shoulder, I recommended observation and activity modification. She will follow up as needed in this regard.\par \par With regard to the right little finger, she will begin to wean off of the splint. She was instructed to begin gentle flexion and extension exercises. I told her if she notices the DIP joint, beginning to flex down once again, she should return to splinting on a more regular basis and return to the office. Otherwise, she will follow up in 2 weeks. \par \par The patient and her daughter have agreed to the above plan of management and has expressed full understanding.  All questions were fully answered to their satisfaction.\par \par My cumulative time spent on today's visit was greater than 30 minutes and included: Preparation for the visit, review of the medical records, review of pertinent diagnostic studies, examination and counseling of the patient on the above diagnosis, treatment plan and prognosis, orders of diagnostic tests, medications and/or appropriate procedures and documentation in the medical records of today's visit.

## 2022-12-21 NOTE — ADDENDUM
[FreeTextEntry1] : I, Aleah Doyle, acted solely as a scribe for Dr. Sandoval on this date on 12/21/2022.

## 2022-12-21 NOTE — HISTORY OF PRESENT ILLNESS
[FreeTextEntry1] : 30 days status post fall resulting in sprain/strain of her left shoulder with underlying impingement syndrome and a prior injury.  She also sustained avulsion fractures off of her right little finger, most notably a central slip avulsion fracture.\par \par Her right little finger was splinted and I recommended symptomatic treatment for her left shoulder.\par \par She is doing well overall and has remained splinted full time although she has reverted back to the original splint she was placed. She states the splint I transitioned her into at the time of her last visit was frequently falling off when she moved her hand. With regard to her shoulder, she reports to be well. She rates her pain to the right little finger as a 6 out of 10 at this time. \par \par I have treated her in the past for left shoulder impingement syndrome.  She was previously given 2 subacromial cortisone she was last seen in the office in this regard greater than 2 years ago and she was referred to physical therapy.\par \par She ambulates with the assistance of a cane. \par \par She was accompanied by her daughter today.

## 2023-01-04 ENCOUNTER — APPOINTMENT (OUTPATIENT)
Dept: ORTHOPEDIC SURGERY | Facility: CLINIC | Age: 83
End: 2023-01-04
Payer: MEDICARE

## 2023-01-04 PROCEDURE — 99213 OFFICE O/P EST LOW 20 MIN: CPT

## 2023-01-04 NOTE — HISTORY OF PRESENT ILLNESS
[FreeTextEntry1] : 44 days status post fall resulting in sprain/strain of her left shoulder with underlying impingement syndrome and a prior injury.  She also sustained avulsion fractures off of her right little finger, most notably a central slip avulsion fracture.\par \par She has been wearing the splint most notably at night. She does note an intermittent tingling sensation. At rest, she does not have pain however with touch/pressure, the finger is sensitivity.\par \par I have treated her in the past for left shoulder impingement syndrome.  She was previously given 2 subacromial cortisone she was last seen in the office in this regard greater than 2 years ago and she was referred to physical therapy.\par \par She ambulates with the assistance of a cane. \par \par She was accompanied by her daughter today.

## 2023-01-04 NOTE — END OF VISIT
[FreeTextEntry3] : This note was written by Aleah Doyle on 01/04/2023 acting solely as a scribe for Dr. Corky Sandoval.\par  \par All medical record entries made by the Scribe were at my, Dr. Corky Sandoval, direction and personally dictated by me on 01/04/2023. I have personally reviewed the chart and agree that the record accurately reflects my personal performance of the history, physical exam, assessment and plan.

## 2023-01-04 NOTE — PHYSICAL EXAM
[de-identified] : - Constitutional: This is a female in no obvious distress.  She was accompanied by her daughter today.\par - Psych: Patient is alert and oriented to person, place and time.  Patient has a normal mood and affect.\par - Cardiovascular: Normal pulses throughout the upper extremities.  No significant varicosities are noted in the upper extremities. \par - Neuro: Strength and sensation are intact throughout the upper extremities.  Patient has normal coordination.\par - Respiratory:  Patient exhibits no evidence of shortness of breath or difficulty breathing.\par - Skin: No rashes, lesions, or other abnormalities are noted in the upper extremities.\par \par ---\par \par Examination of her right little finger demonstrates her abrasion to be healed.  There is residual although decreased swelling..  She has limitation of flexion and extension.  There is loss of the terminal 20 degrees of PIP extension and 10 degrees of DIP extension actively with full passive extension.  There is limitation of flexion.  She remains neurovascularly intact distally. [de-identified] : Repeat AP, lateral and oblique radiographs of her right little finger demonstrate an avulsion fracture off of the little finger DIP joint radial collateral ligament and a possible fracture at the little finger distal phalanx at the insertion of the ulnar collateral ligament.  On the lateral view, there is evidence of an avulsion fracture off of the dorsal base of the middle phalanx at the insertion of the central slip of the extensor tendon, a fragment noted dorsal to the middle phalanx as well as the volar plate avulsion fracture.  There is no obvious healing noted.\par \par Recent AP and Y radiographs of her left shoulder demonstrated no obvious fractures or dislocations.\par \par A PREVIOUS MRI of her left shoulder demonstrated moderate grade articular surface tearing at the mid fibers of the supraspinatus tendon with articular surface retraction proximally.  Superimposed focal full-thickness perforation at the insertion of the mid fibers of the supraspinatus.  Insertional tear of the infraspinatus.  Market intra-articular biceps tendinosis with longitudinal tearing at the anchor with an associated SLAP tear.\par \par

## 2023-01-04 NOTE — DISCUSSION/SUMMARY
[FreeTextEntry1] : I had a discussion regarding today's visit, the diagnosis and treatment recommendations and options.  We also discussed changes since the last visit.  \par \par With regard to the left shoulder, I recommended observation and activity modification. She will follow up as needed in this regard.\par \par With regard to the right little finger, she will discontinue use of the splint at night. I told her that ultimately the finger will not be perfect and she likely will lose the last few degrees of terminal extension at the DIP joint. With that being said, I would not recommend further splinting as I'd like to have her begin to move the joint and regain flexion.  She did ask me about going to hand therapy, which I advised against. She will follow up in 4 weeks for reevaluation. Finally, she was instructed to return to the office if she notices the DIP joint flexing down further than it currently is.\par \par The patient and her daughter have agreed to the above plan of management and has expressed full understanding.  All questions were fully answered to their satisfaction.\par \par My cumulative time spent on today's visit was greater than 30 minutes and included: Preparation for the visit, review of the medical records, review of pertinent diagnostic studies, examination and counseling of the patient on the above diagnosis, treatment plan and prognosis, orders of diagnostic tests, medications and/or appropriate procedures and documentation in the medical records of today's visit.

## 2023-01-04 NOTE — ADDENDUM
[FreeTextEntry1] : I, Aleah Doyle, acted solely as a scribe for Dr. Sandoval on this date on 01/04/2023.

## 2023-01-30 ENCOUNTER — NON-APPOINTMENT (OUTPATIENT)
Age: 83
End: 2023-01-30

## 2023-02-01 PROBLEM — M75.41 IMPINGEMENT SYNDROME OF RIGHT SHOULDER: Status: ACTIVE | Noted: 2019-11-22

## 2023-02-01 PROBLEM — S46.912A LEFT SHOULDER STRAIN: Status: ACTIVE | Noted: 2022-11-23

## 2023-02-01 PROBLEM — S62.626A FRACTURE OF MIDDLE PHALANX OF RIGHT LITTLE FINGER: Status: ACTIVE | Noted: 2022-11-23

## 2023-02-08 ENCOUNTER — APPOINTMENT (OUTPATIENT)
Dept: ORTHOPEDIC SURGERY | Facility: CLINIC | Age: 83
End: 2023-02-08
Payer: MEDICARE

## 2023-02-08 DIAGNOSIS — M75.41 IMPINGEMENT SYNDROME OF RIGHT SHOULDER: ICD-10-CM

## 2023-02-08 DIAGNOSIS — S46.912A STRAIN OF UNSPECIFIED MUSCLE, FASCIA AND TENDON AT SHOULDER AND UPPER ARM LEVEL, LEFT ARM, INITIAL ENCOUNTER: ICD-10-CM

## 2023-02-08 DIAGNOSIS — S62.626A DISPLACED FX OF MIDDLE PHALANX OF RT LITTLE FINGER INITIAL  ENC. FOR CLOSED FX: ICD-10-CM

## 2023-02-08 PROCEDURE — 99213 OFFICE O/P EST LOW 20 MIN: CPT

## 2023-02-08 NOTE — END OF VISIT
[FreeTextEntry3] : This note was written by Aleah Doyle on 02/08/2023 acting solely as a scribe for Dr. Corky Sandoval.\par  \par All medical record entries made by the Scribe were at my, Dr. Corky Sandoval, direction and personally dictated by me on 02/08/2023. I have personally reviewed the chart and agree that the record accurately reflects my personal performance of the history, physical exam, assessment and plan.

## 2023-02-08 NOTE — ADDENDUM
[FreeTextEntry1] : I, Aleah Doyle, acted solely as a scribe for Dr. Sandoval on this date on 02/08/2023.

## 2023-02-08 NOTE — PHYSICAL EXAM
[de-identified] : - Constitutional: This is a female in no obvious distress.  She was accompanied by her daughter today.\par - Psych: Patient is alert and oriented to person, place and time.  Patient has a normal mood and affect.\par - Cardiovascular: Normal pulses throughout the upper extremities.  No significant varicosities are noted in the upper extremities. \par - Neuro: Strength and sensation are intact throughout the upper extremities.  Patient has normal coordination.\par - Respiratory:  Patient exhibits no evidence of shortness of breath or difficulty breathing.\par - Skin: No rashes, lesions, or other abnormalities are noted in the upper extremities.\par \par ---\par \par Examination of her right little finger demonstrates her abrasion to be healed.  There is residual although decreased swelling..  She has limitation of flexion and extension.  There is loss of the terminal 10 degrees of PIP extension and 10 degrees of DIP extension actively with full passive extension.  There is limitation of flexion into the palm, which is fairly similar to the other digits in the hand.  She remains neurovascularly intact distally. [de-identified] : Repeat AP, lateral and oblique radiographs of her right little finger demonstrate an avulsion fracture off of the little finger DIP joint radial collateral ligament and a possible fracture at the little finger distal phalanx at the insertion of the ulnar collateral ligament.  On the lateral view, there is evidence of an avulsion fracture off of the dorsal base of the middle phalanx at the insertion of the central slip of the extensor tendon, a fragment noted dorsal to the middle phalanx as well as the volar plate avulsion fracture.  There is no obvious healing noted.\par \par Recent AP and Y radiographs of her left shoulder demonstrated no obvious fractures or dislocations.\par \par A PREVIOUS MRI of her left shoulder demonstrated moderate grade articular surface tearing at the mid fibers of the supraspinatus tendon with articular surface retraction proximally.  Superimposed focal full-thickness perforation at the insertion of the mid fibers of the supraspinatus.  Insertional tear of the infraspinatus.  Market intra-articular biceps tendinosis with longitudinal tearing at the anchor with an associated SLAP tear.\par \par

## 2023-02-08 NOTE — DISCUSSION/SUMMARY
[FreeTextEntry1] : I had a discussion regarding today's visit, the diagnosis and treatment recommendations and options.  We also discussed changes since the last visit.  At this time, I did tell her that although she does have some residual loss of motion, functionally there should not be any gross deficits. She may advance her activities as tolerated. If she has any increased issues or concerns going forward, she was instructed to call the office and return. She will follow up as needed, according to her symptoms. \par \par The patient has agreed to the above plan of management and has expressed full understanding.  All questions were fully answered to the patient's satisfaction.\par \par My cumulative time spent on today's visit was greater than 30 minutes and included: Preparation for the visit, review of the medical records, review of pertinent diagnostic studies, examination and counseling of the patient on the above diagnosis, treatment plan and prognosis, orders of diagnostic tests, medications and/or appropriate procedures and documentation in the medical records of today's visit.

## 2023-02-08 NOTE — HISTORY OF PRESENT ILLNESS
[FreeTextEntry1] : 79 days status post fall resulting in sprain/strain of her left shoulder with underlying impingement syndrome and a prior injury.  She also sustained avulsion fractures off of her right little finger, most notably a central slip avulsion fracture.\par \par She notes residual symptoms at the right little finger. She denies pain at rest however states she only has pain if she accidentally strikes the finger. She states at times she is hindered with day to day functions as when she tries to hold certain items, she drops them. She also notes intermittent tingling. Regarding her left shoulder, she is doing well and denies pain.\par \par I have treated her in the past for left shoulder impingement syndrome.  She was previously given 2 subacromial cortisone she was last seen in the office in this regard greater than 2 years ago and she was referred to physical therapy.\par \par She ambulates with the assistance of a cane. \par \par She was accompanied by her daughter today.

## 2023-02-15 NOTE — PATIENT PROFILE ADULT - NSPROEXTENSIONSOFSELF_GEN_A_NUR
Coronary angiogram/PCI/Right Heart Cath prep instructions.     Patient is scheduled for a Coronary Angiogram at [facility] - [address], [place] [address] - Main Entrance of the Hospital on 2/23/23.  Check in time is at 0730AM  and procedure to follow.    Patient instructed to remain NPO for solid foods 8 hours prior to arrival and may have clear liquids up to 2 hours prior to arrival.    Patient Patient does not require extra fluids prior to procedure.    Patient is on metformin and has been advised to hold Metformin the day of the procedure. They should continue to hold until after follow up BMP scheduled 2/27/2023 at 9:30AM is reviewed.  Pt will be called and advised when they can resume their metformin.    Patient is not on anticoagulation.    Patient is not on diuretics.       Patient is taking ASA 81mg daily and will take 4 tabs (324mg) the morning of the procedure.    Pt is not on a SGLT2 inhibitor.    Patient advised to take their other daily medications the morning of the procedure with small sips of water.     Verified patient does not have a contrast allergy.    Verified patient has someone available to drive them home from the hospital and can stay with them for 24 hours after the procedure.     Patient advised to notify care team with any new COVID like symptoms prior to procedure.    Patient will check their temperature the morning of procedure and call [facility] at [phone] if temp is >100.0.    Patient is aware of visitor policy.    Patient expresses understanding of above instructions and denies further questions at this time.      MATTIE Jimenez RN, BSN.   [facility]   02/15/23 11:19 AM    eyeglasses

## 2023-07-10 ENCOUNTER — APPOINTMENT (OUTPATIENT)
Dept: ORTHOPEDIC SURGERY | Facility: CLINIC | Age: 83
End: 2023-07-10
Payer: MEDICARE

## 2023-07-10 VITALS — DIASTOLIC BLOOD PRESSURE: 82 MMHG | SYSTOLIC BLOOD PRESSURE: 156 MMHG | HEART RATE: 69 BPM

## 2023-07-10 PROCEDURE — 99214 OFFICE O/P EST MOD 30 MIN: CPT

## 2023-07-10 PROCEDURE — 73562 X-RAY EXAM OF KNEE 3: CPT | Mod: LT

## 2023-07-10 RX ORDER — ROMOSOZUMAB-AQQG 105 MG/1.17ML
105 INJECTION, SOLUTION SUBCUTANEOUS
Refills: 0 | Status: ACTIVE | COMMUNITY

## 2023-07-10 NOTE — HISTORY OF PRESENT ILLNESS
[Stable] : stable [de-identified] : Patient presents a follow-up visit for S/p Left TKR DOS 12/9/2020. The patient reports that she has no pain to her knee at this time. She notes some stiffness to her knee but otherwise she is doing well. She currently goes to physical therapy twice a week and find great results with attendance. The patient currently takes no pain medication at this time.

## 2023-07-10 NOTE — DISCUSSION/SUMMARY
[PRN] : PRN [de-identified] : I discussed the underlying pathophysiology of the patient's condition. The patient is currently happy with her surgical results. Activity modifications were reviewed with the patient at length. The patient should continue her current treatment regimen if she has seen improvement to her symptoms. I provided the patient with a detailed prescription for physical therapy.  If the patient has any severe exacerbation of his symptoms, she  should follow-up with me as soon as possible. Otherwise, the patient should follow-up with me as needed.

## 2023-07-10 NOTE — PHYSICAL EXAM
[Cane] : ambulates with cane [LE] : Sensory: Intact in bilateral lower extremities [Normal RLE] : Right Lower Extremity: No scars, rashes, lesions, ulcers, skin intact [Normal LLE] : Left Lower Extremity: No scars, rashes, lesions, ulcers, skin intact [Normal] : Oriented to person, place, and time, insight and judgement were intact and the affect was normal [Knee Swelling Left] : no swelling [Knee Tenderness On Palpation Left] : no tenderness [Knee Motion Left Crepitus] : no crepitus with ROM [Knee Anterior Drawer Sign Left] : negative anterior drawer sign [Knee Medial Instability Left] : no laxity on valgus stress [Knee Lateral Instability Left] : no  laxity on varus stress [FreeTextEntry2] : Range of motion is -5 degrees of extension to 95 degrees of flexion. [de-identified] : There is a well-healed anterior left knee replacement scar. [de-identified] : AP & Lateral [place] Views X-Rays performed on 07/10/2023 shows Implant in anatomic position without signs of loosening, periprosthetic fracture, or subsidence.

## 2023-07-10 NOTE — ADDENDUM
[FreeTextEntry1] : I, Esvin Van Cowan, acted solely as a scribe for Dr. Roberto Lopez on this date 07/10/2023 .\par \par All medical record entries made by the Scribe were at my, Dr. Roberto Lopez, direction and personally dictated by me on 07/10/2023 . I have reviewed the chart and agree that the record accurately reflects my personal performance of the history, physical exam, assessment and plan. I have also personally directed, reviewed, and agreed with the chart.

## 2024-01-11 NOTE — ASU PATIENT PROFILE, ADULT - URINARY CATHETER
Physical Therapy Visit    Visit Type: Daily Treatment Note  Visit: 3  Referring Provider: Yoly Brandon PT  Medical Diagnosis (from order): M25.562 - Left knee pain     SUBJECTIVE                                                                                                               Doing well with no pain over the last week.  Did a little bit of [facility]ging [facility] and this was [facility]; he was wearing his J-brace for this.   Functional Change: As above    Pain / Symptoms  - Pain rating (out of 10): Current: 0       OBJECTIVE                                                                                                                                       Treatment     Therapeutic Exercise  Bike, Seat 10, Level 10 with intervals of Level 13 x 2' x3 (total time 13')  Unilateral Leg press, 50# 2x10 each leg, cues to control momentum toward full knee extension  Seated unilateral knee extension, 15#, 2x10 left   Prone unilateral knee flexion, 15#, 2x10 left   RDL unilateral, 20#, 2x10 each leg  Elevated heel squat 1x10  (some pain on left knee with depth to 100 degrees, alleviated with shallower depth)  Back squat 20# 2x10  Box jump down 2x10  Lateral step up, 12\" some crepitus left knee, reduced to 8\" on second set for improved control and no crepitus 2x10  Pogo jump 30\"x2  Instruction in jogging program: 1' interval on level, indoor, straight path (or gentle curve) track, 3-5x.          Skilled input: verbal instruction/cues and tactile instruction/cues    Writer verbally educated and received verbal consent for hand placement, positioning of patient, and techniques to be performed today from patient for clothing adjustments for techniques, therapist position for techniques and hand placement and palpation for techniques as described above and how they are pertinent to the patient's plan of care.  Home Exercise Program  Access Code: [email or web address]  URL:  [email or web address]  Date: 01/11/2024  Prepared by: Yoly Brandon    Program Notes  Pogo jumps 30 seconds, 2xJogging intervals (flat and straight): 1 minute, 3-5x    Exercises  - Single Leg Heel Raise  - 1 x daily - 3 x weekly - 2 sets - 10-20 reps  - SQUAT AND HEEL RAISE  - 1 x daily - 3 x weekly - 2 sets - 10-20 reps  - Single Leg Press  - 1 x daily - 3 x weekly - 3 sets - 10 reps - 45-65 lbs weight  - Squat with heels elevated  - 1 x daily - 3 x weekly - 1 sets - 10 reps  - Single Leg Knee Extension with Weight Machine  - 1 x daily - 7 x weekly - 2-3 sets - 10 reps - 10-20 lbs weight  - Single Leg Hamstring Curl with Weight Machine  - 1 x daily - 3 x weekly - 3 sets - 10 reps - 10-20 lbs weight  - Single-Leg Kenyan Deadlift With Kettlebell  - 1 x daily - 3 x weekly - 2-3 sets - 10 reps - 20 lbs weight  - Side Stepping with Resistance at Ankles  - 1 x daily - 3 x weekly - 3 sets - 10 reps  - Barbell Squat  - 1 x daily - 7 x weekly - 1-2 sets - 10 reps - 20-45 lb weight   - Jump Off Platform with Soft Landing  - 1 x daily - 7 x weekly - 2 sets - 10 reps  - Lateral Step Up  - 1 x daily - 7 x weekly - 2 sets - 10 reps - 8 inch step height      ASSESSMENT                                                                                                            Progressing well with light impact and progression of strength/load.    Pain/symptoms after session (out of 10): 0  Education:   - Results of above outlined education: Verbalizes understanding and Demonstrates understanding    PLAN                                                                                                                           Suggestions for next session as indicated: Progress per plan of care, advance strengthening, balance with multi-task complexity,  light impact and agility       Therapy procedure time and total treatment time can be found documented on the Time Entry flowsheet     no

## 2024-03-11 NOTE — DISCHARGE NOTE PROVIDER - PROVIDER TOKENS
Is the patient due for a refill? Yes    Was the patient seen the past year? Yes    Date of last office visit: 06/30/2023    Does the patient have an upcoming appointment?  No  Provider to refill:RT    Does the patients insurance require a 100 day supply?  No   PROVIDER:[TOKEN:[[id number]],FOLLOWUP:[2 weeks],ESTABLISHEDPATIENT:[T]]

## 2024-04-01 ENCOUNTER — APPOINTMENT (OUTPATIENT)
Dept: ORTHOPEDIC SURGERY | Facility: CLINIC | Age: 84
End: 2024-04-01
Payer: MEDICARE

## 2024-04-01 VITALS — SYSTOLIC BLOOD PRESSURE: 180 MMHG | HEART RATE: 86 BPM | DIASTOLIC BLOOD PRESSURE: 80 MMHG

## 2024-04-01 DIAGNOSIS — Z96.652 PRESENCE OF LEFT ARTIFICIAL KNEE JOINT: ICD-10-CM

## 2024-04-01 PROCEDURE — 99214 OFFICE O/P EST MOD 30 MIN: CPT

## 2024-04-01 NOTE — DISCUSSION/SUMMARY
[Other: ____] : in [unfilled] [de-identified] : I discussed the underlying pathophysiology of the patient's condition. The patient expressed to me that she would like to restart a course of physical therapy for her left knee. I provided the patient with a detailed prescription for physical therapy. If the patient begins to experience any changes or severe exacerbation of her symptoms, she should reach out to me as soon as possible. Otherwise, she should return to me in 6 Months.

## 2024-04-01 NOTE — HISTORY OF PRESENT ILLNESS
[Stable] : stable [0] : a current pain level of 0/10 [de-identified] : Patient presents a follow-up visit for S/p Left TKR DOS 12/9/2020. The patient states that she continues to have symptoms pertaining to her left knee at this time. The patient reports no significant changes to their symptoms since their last visit. The patient denies any symptoms of numbness, tingling, or weakness. She notes some stiffness and buckling to her left knee recently. She would like to restart a course of physical therapy at this time.

## 2024-04-01 NOTE — PHYSICAL EXAM
[Cane] : ambulates with cane [LE] : Sensory: Intact in bilateral lower extremities [Normal RLE] : Right Lower Extremity: No scars, rashes, lesions, ulcers, skin intact [Normal LLE] : Left Lower Extremity: No scars, rashes, lesions, ulcers, skin intact [Normal] : Oriented to person, place, and time, insight and judgement were intact and the affect was normal [Knee Swelling Left] : no swelling [Knee Tenderness On Palpation Left] : no tenderness [Knee Anterior Drawer Sign Left] : negative anterior drawer sign [Knee Motion Left Crepitus] : no crepitus with ROM [Knee Medial Instability Left] : no laxity on valgus stress [Knee Lateral Instability Left] : no  laxity on varus stress [FreeTextEntry2] : Range of motion is -5 degrees of extension to 95 degrees of flexion. [de-identified] : There is a well-healed anterior left knee replacement scar.

## 2024-04-17 NOTE — BRIEF OPERATIVE NOTE - NSICDXBRIEFPROCEDURE_GEN_ALL_CORE_FT
Herpes Agent Protocol [id number]/17/2024 12:39 PM   Protocol Details In person appointment or virtual visit in the past 12 mos or appointment in next 3 mos      6. History of cold sores  - valACYclovir 1 G Oral Tab; Take 1 tablet (1,000 mg total) by mouth every 12 (twelve) hours.  Dispense: 2 tablet; Refill: 1  Future Appointments   Date Time Provider Department Center   4/23/2024  9:00 AM REF BK RD REF [id number] Ref [facility]   4/26/2024  9:40 AM Eunice Shepherd MD [facility]      PROCEDURES:  TKR (total knee replacement) 08-Dec-2020 10:43:05 Yosef Moody

## 2024-10-02 ENCOUNTER — APPOINTMENT (OUTPATIENT)
Dept: ORTHOPEDIC SURGERY | Facility: CLINIC | Age: 84
End: 2024-10-02
Payer: MEDICARE

## 2024-10-02 VITALS — HEART RATE: 76 BPM | DIASTOLIC BLOOD PRESSURE: 70 MMHG | SYSTOLIC BLOOD PRESSURE: 156 MMHG

## 2024-10-02 DIAGNOSIS — M54.16 RADICULOPATHY, LUMBAR REGION: ICD-10-CM

## 2024-10-02 PROCEDURE — 99214 OFFICE O/P EST MOD 30 MIN: CPT

## 2024-10-02 NOTE — ADDENDUM
[FreeTextEntry1] :  I, Desiree Gibsond, acted solely as a scribe for Dr. Roberto Lopez on this date 10/02/2024 .  All medical records entries made by the Scribe were at my Dr. Roberto Lopez direction and personally dictated by me on 10/02/2024. I have reviewed the chart and agree that the record accurately reflects my personal performance of the history, physical exam, assessment and plan. I have also personally directed, reviewed, and agreed with the chart.

## 2024-10-02 NOTE — REASON FOR VISIT
[Follow-Up Visit] : a follow-up visit for [Knee Pain] : knee pain [Total Knee Replacement Surgery, Aftercare] : total knee replacement surgery, aftercare [Family Member] : family member

## 2024-10-02 NOTE — PHYSICAL EXAM
[Knee] : patellar 2+ and symmetric bilaterally [Ankle] : ankle 2+ and symmetric bilaterally [LE] : Sensory: Intact in bilateral lower extremities [Normal DTR Reflexes] : DTR reflexes normal [Normal] : No costovertebral angle tenderness and no spinal tenderness [de-identified] : Negative FABERs Bilaterally [de-identified] : AP lateral flexion-extension x-rays of the lumbar spine were not obtained today.

## 2024-10-02 NOTE — HISTORY OF PRESENT ILLNESS
[Pain Location] : pain [] : left leg [Stable] : stable [___ mths] : [unfilled] month(s) ago [de-identified] :  Patients presents for a follow up visit for s/p Left TKR DOS:12/19/20. The patient states that she has numbness and tingling in the left lower extremities, with no pain or discomfort. She notes that this began in a constant way about 2 months ago. She notes that she had lower back pain but no buckling or giving away of the lower extremity. She is currently taking Prolia for her osteoporosis. She notes some stiffness.

## 2024-10-02 NOTE — DISCUSSION/SUMMARY
[6 Weeks] : in 6 weeks [de-identified] :  I have discussed the underlying pathophysiology of the patient's condition.  I expressed to patient that her symptoms are consistent with left lumbar radiculopathy. I highly recommend that the patient starts a course of physical therapy at this time. I have provided the patient with a detailed prescription for physical therapy. The patient should follow up with me in 6 weeks for further assessment of symptoms.

## 2024-12-02 ENCOUNTER — APPOINTMENT (OUTPATIENT)
Dept: ORTHOPEDIC SURGERY | Facility: CLINIC | Age: 84
End: 2024-12-02
Payer: MEDICARE

## 2024-12-02 DIAGNOSIS — Z96.652 PRESENCE OF LEFT ARTIFICIAL KNEE JOINT: ICD-10-CM

## 2024-12-02 DIAGNOSIS — M54.16 RADICULOPATHY, LUMBAR REGION: ICD-10-CM

## 2024-12-02 PROCEDURE — 99214 OFFICE O/P EST MOD 30 MIN: CPT

## 2025-06-02 ENCOUNTER — APPOINTMENT (OUTPATIENT)
Dept: ORTHOPEDIC SURGERY | Facility: CLINIC | Age: 85
End: 2025-06-02